# Patient Record
Sex: FEMALE | Race: WHITE | NOT HISPANIC OR LATINO | Employment: FULL TIME
[De-identification: names, ages, dates, MRNs, and addresses within clinical notes are randomized per-mention and may not be internally consistent; named-entity substitution may affect disease eponyms.]

---

## 2017-08-19 ENCOUNTER — HEALTH MAINTENANCE LETTER (OUTPATIENT)
Age: 37
End: 2017-08-19

## 2017-10-10 ENCOUNTER — OFFICE VISIT (OUTPATIENT)
Dept: INTERNAL MEDICINE | Facility: CLINIC | Age: 37
End: 2017-10-10
Payer: COMMERCIAL

## 2017-10-10 VITALS
HEART RATE: 80 BPM | HEIGHT: 68 IN | SYSTOLIC BLOOD PRESSURE: 108 MMHG | TEMPERATURE: 98.5 F | WEIGHT: 271 LBS | OXYGEN SATURATION: 98 % | DIASTOLIC BLOOD PRESSURE: 60 MMHG | BODY MASS INDEX: 41.07 KG/M2

## 2017-10-10 DIAGNOSIS — F32.1 MODERATE MAJOR DEPRESSION (H): ICD-10-CM

## 2017-10-10 DIAGNOSIS — R06.83 SNORING: ICD-10-CM

## 2017-10-10 DIAGNOSIS — E66.01 MORBID OBESITY (H): ICD-10-CM

## 2017-10-10 DIAGNOSIS — Z00.01 ENCOUNTER FOR ROUTINE ADULT MEDICAL EXAM WITH ABNORMAL FINDINGS: Primary | ICD-10-CM

## 2017-10-10 PROCEDURE — 99214 OFFICE O/P EST MOD 30 MIN: CPT | Mod: 25 | Performed by: INTERNAL MEDICINE

## 2017-10-10 PROCEDURE — 99395 PREV VISIT EST AGE 18-39: CPT | Performed by: INTERNAL MEDICINE

## 2017-10-10 RX ORDER — DESVENLAFAXINE 25 MG/1
25 TABLET, EXTENDED RELEASE ORAL DAILY
Qty: 30 TABLET | Refills: 11 | Status: SHIPPED | OUTPATIENT
Start: 2017-10-10 | End: 2018-10-23

## 2017-10-10 ASSESSMENT — PATIENT HEALTH QUESTIONNAIRE - PHQ9: SUM OF ALL RESPONSES TO PHQ QUESTIONS 1-9: 9
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
## 2017-10-10 NOTE — NURSING NOTE
"Chief Complaint   Patient presents with     Physical       Initial /60  Pulse 80  Temp 98.5  F (36.9  C) (Oral)  Ht 5' 8\" (1.727 m)  Wt 271 lb (122.9 kg)  LMP 09/25/2017  SpO2 98%  BMI 41.21 kg/m2 Estimated body mass index is 41.21 kg/(m^2) as calculated from the following:    Height as of this encounter: 5' 8\" (1.727 m).    Weight as of this encounter: 271 lb (122.9 kg).  Medication Reconciliation: complete  "

## 2017-10-10 NOTE — MR AVS SNAPSHOT
After Visit Summary   10/10/2017    Shannen Evans    MRN: [id number]           Patient Information     Date Of Birth          1980        Visit Information        Provider Department      10/10/2017 11:30 AM Kevin Francis MD [facility]        Today's Diagnoses     Moderate major depression (H)    -  1    Snoring        Encounter for routine adult medical exam with abnormal findings          Care Instructions    Pristiq  25mg tab, 1 tab daily for depression symptoms.   See me in 1 bob for follow-up. Call earlier if side effects with medication  Fasting labs in the next 1 month  [facility]. Call for appt  Referral to [facility]. They will call to schedule  Calorie/carbohydrate (sugar, bread, potato, pasta, etc) reduction in diet for weight loss.  Increase color on your plate with fruits and vegetables. Increase  frequency of walking or other aerobic exercise as able (goal is daily)                 Follow-ups after your visit        Additional Services     MENTAL HEALTH REFERRAL       Your provider has referred you to: [facility]: [facility] - Counseling (Individual/Couples/Family) - [facility] [phone]   [email or web address]/   *Patient will be contacted by [facility] scheduling partner, [facility]), to schedule an appointment.  Patients may also call [facility] to schedule.    All scheduling is subject to the client's specific insurance plan & benefits, provider/location availability, and provider clinical specialities.  Please arrive 15 minutes early for your first appointment and bring your completed paperwork.    Please be aware that coverage of these services is subject to the terms and limitations of your health insurance plan.  Call member services at your health plan with any benefit or coverage questions.            SLEEP  EVALUATION & MANAGEMENT REFERRAL - ADULT       Please be aware that coverage of these services is subject to the terms and limitations of your health insurance plan.  Call member services at your health plan with any benefit or coverage questions.      Please bring the following to your appointment:    >>   List of current medications   >>   This referral request   >>   Any documents/labs given to you for this referral    [place] [facility])   [phone] (Age 18 and up)                  Future tests that were ordered for you today     Open Future Orders        Priority Expected Expires Ordered    Comprehensive metabolic panel Routine 10/17/2017 10/10/2018 10/10/2017    TSH with free T4 reflex Routine 10/17/2017 10/10/2018 10/10/2017    CBC with platelets Routine 10/17/2017 10/10/2018 10/10/2017    Lipid panel reflex to direct LDL Routine 10/17/2017 10/10/2018 10/10/2017    Vitamin D Deficiency Routine 10/17/2017 10/10/2018 10/10/2017    SLEEP EVALUATION & MANAGEMENT REFERRAL - ADULT Routine  10/10/2018 10/10/2017            Who to contact     If you have questions or need follow up information about today's clinic visit or your schedule please contact [facility] directly at [phone].  Normal or non-critical lab and imaging results will be communicated to you by Exit41hart, letter or phone within 4 business days after the clinic has received the results. If you do not hear from us within 7 days, please contact the clinic through Exit41hart or phone. If you have a critical or abnormal lab result, we will notify you by phone as soon as possible.  Submit refill requests through [employer] or call your pharmacy and they will forward the refill request to us. Please allow 3 business days for your refill to be completed.          Additional Information About Your Visit        Exit41harOrthohub Information     [employer] gives you secure access to your electronic health record. If you see a primary care  "provider, you can also send messages to your care team and make appointments. If you have questions, please call your primary care clinic.  If you do not have a primary care provider, please call [phone] and they will assist you.        Care EveryWhere ID     This is your Care EveryWhere ID. This could be used by other organizations to access your [facility] medical records  [id number]        Your Vitals Were     Pulse Temperature Height Last Period Pulse Oximetry BMI (Body Mass Index)    80 98.5  F (36.9  C) (Oral) 5' 8\" (1.727 m) 09/25/2017 98% 41.21 kg/m2       Blood Pressure from Last 3 Encounters:   10/10/17 108/60   12/07/16 102/66   11/06/16 106/80    Weight from Last 3 Encounters:   10/10/17 271 lb (122.9 kg)   11/06/16 274 lb 9.6 oz (124.6 kg)   02/16/16 299 lb (135.6 kg)              We Performed the Following     MENTAL HEALTH REFERRAL          Today's Medication Changes          These changes are accurate as of: 10/10/17 12:25 PM.  If you have any questions, ask your nurse or doctor.               Start taking these medicines.        Dose/Directions    desvenlafaxine succinate 25 MG 24 hr tablet   Commonly known as:  PRISTIQ   Used for:  Moderate major depression (H)   Started by:  Kevin Francis MD        Dose:  25 mg   Take 1 tablet (25 mg) by mouth daily   Quantity:  30 tablet   Refills:  11         Stop taking these medicines if you haven't already. Please contact your care team if you have questions.     albuterol 108 (90 BASE) MCG/ACT Inhaler   Commonly known as:  PROVENTIL HFA   Stopped by:  Kevin Francis MD           calcium carbonate 500 MG chewable tablet   Commonly known as:  TUMS   Stopped by:  Kevin Francis MD                Where to get your medicines      These medications were sent to [facility] [address] [place], [place] - [address] AT [facility] [place]  [address], [facility] [address]     Phone:  [phone]     desvenlafaxine " succinate 25 MG 24 hr tablet                Primary Care Provider Office Phone # Fax #    Kevin Francis [phone]       [address] [facility] [address]        Equal Access to Services     LIV DELGADO : Apurva steel mike Soelle, waaxda luqadaha, qaybta kaalmada adehanhda, nazanin hu jean-pierre valentine. So [facility] [phone].    ATENCIÓN: Si habla español, tiene a kim disposición servicios gratuitos de asistencia lingüística. Llame al [phone].    We comply with applicable federal civil rights laws and [place] laws. We do not discriminate on the basis of race, color, national origin, age, disability, sex, sexual orientation, or gender identity.            Thank you!     Thank you for choosing [facility]  for your care. Our goal is always to provide you with excellent care. Hearing back from our patients is one way we can continue to improve our services. Please take a few minutes to complete the written survey that you may receive in the mail after your visit with us. Thank you!             Your Updated Medication List - Protect others around you: Learn how to safely use, store and throw away your medicines at www.disposemymeds.org.          This list is accurate as of: 10/10/17 12:25 PM.  Always use your most recent med list.                   Brand Name Dispense Instructions for use Diagnosis    desvenlafaxine succinate 25 MG 24 hr tablet    PRISTIQ    30 tablet    Take 1 tablet (25 mg) by mouth daily    Moderate major depression (H)       ibuprofen 400 MG tablet    ADVIL/MOTRIN    120 tablet    Take 1-2 tablets (400-800 mg) by mouth every 6 hours as needed for other (cramping)    S/P primary low transverse        TYLENOL PO      Take 1,000 mg by mouth every 8 hours as needed for mild pain or fever (back pain)        vitamin D 2000 UNITS tablet     100 tablet    Take 2,000 Units by mouth daily    Vitamin D deficiency       ZOLOFT 50 MG  tablet   Generic drug:  sertraline      Take 50 mg by mouth daily

## 2017-10-10 NOTE — PATIENT INSTRUCTIONS
Pristiq  25mg tab, 1 tab daily for depression symptoms.   See me in 1 bob for follow-up. Call earlier if side effects with medication  Fasting labs in the next 1 month  [facility]. Call for appt  [facility]erral to [facility]. They will call to schedule  Calorie/carbohydrate (sugar, bread, potato, pasta, etc) reduction in diet for weight loss.  Increase color on your plate with fruits and vegetables. Increase  frequency of walking or other aerobic exercise as able (goal is daily)

## 2017-10-11 PROBLEM — E66.01 MORBID OBESITY (H): Status: ACTIVE | Noted: 2017-10-11

## 2017-11-08 DIAGNOSIS — Z00.01 ENCOUNTER FOR ROUTINE ADULT MEDICAL EXAM WITH ABNORMAL FINDINGS: ICD-10-CM

## 2017-11-08 LAB
ALBUMIN SERPL-MCNC: 3.4 G/DL (ref 3.4–5)
ALP SERPL-CCNC: 89 U/L (ref 40–150)
ALT SERPL W P-5'-P-CCNC: 55 U/L (ref 0–50)
ANION GAP SERPL CALCULATED.3IONS-SCNC: 4 MMOL/L (ref 3–14)
AST SERPL W P-5'-P-CCNC: 41 U/L (ref 0–45)
BILIRUB SERPL-MCNC: 0.9 MG/DL (ref 0.2–1.3)
BUN SERPL-MCNC: 14 MG/DL (ref 7–30)
CALCIUM SERPL-MCNC: 8.8 MG/DL (ref 8.5–10.1)
CHLORIDE SERPL-SCNC: 106 MMOL/L (ref 94–109)
CHOLEST SERPL-MCNC: 166 MG/DL
CO2 SERPL-SCNC: 28 MMOL/L (ref 20–32)
CREAT SERPL-MCNC: 0.75 MG/DL (ref 0.52–1.04)
DEPRECATED CALCIDIOL+CALCIFEROL SERPL-MC: 23 UG/L (ref 20–75)
ERYTHROCYTE [DISTWIDTH] IN BLOOD BY AUTOMATED COUNT: 13 % (ref 10–15)
GFR SERPL CREATININE-BSD FRML MDRD: 88 ML/MIN/1.7M2
GLUCOSE SERPL-MCNC: 98 MG/DL (ref 70–99)
HCT VFR BLD AUTO: 41.7 % (ref 35–47)
HDLC SERPL-MCNC: 44 MG/DL
HGB BLD-MCNC: 13.7 G/DL (ref 11.7–15.7)
LDLC SERPL CALC-MCNC: 91 MG/DL
MCH RBC QN AUTO: 30.1 PG (ref 26.5–33)
MCHC RBC AUTO-ENTMCNC: 32.9 G/DL (ref 31.5–36.5)
MCV RBC AUTO: 92 FL (ref 78–100)
NONHDLC SERPL-MCNC: 122 MG/DL
PLATELET # BLD AUTO: 201 10E9/L (ref 150–450)
POTASSIUM SERPL-SCNC: 4.2 MMOL/L (ref 3.4–5.3)
PROT SERPL-MCNC: 7.5 G/DL (ref 6.8–8.8)
RBC # BLD AUTO: 4.55 10E12/L (ref 3.8–5.2)
SODIUM SERPL-SCNC: 138 MMOL/L (ref 133–144)
TRIGL SERPL-MCNC: 157 MG/DL
TSH SERPL DL<=0.005 MIU/L-ACNC: 1.26 MU/L (ref 0.4–4)
WBC # BLD AUTO: 8.1 10E9/L (ref 4–11)

## 2017-11-08 PROCEDURE — 80053 COMPREHEN METABOLIC PANEL: CPT | Performed by: INTERNAL MEDICINE

## 2017-11-08 PROCEDURE — 36415 COLL VENOUS BLD VENIPUNCTURE: CPT | Performed by: INTERNAL MEDICINE

## 2017-11-08 PROCEDURE — 82306 VITAMIN D 25 HYDROXY: CPT | Performed by: INTERNAL MEDICINE

## 2017-11-08 PROCEDURE — 85027 COMPLETE CBC AUTOMATED: CPT | Performed by: INTERNAL MEDICINE

## 2017-11-08 PROCEDURE — 84443 ASSAY THYROID STIM HORMONE: CPT | Performed by: INTERNAL MEDICINE

## 2017-11-08 PROCEDURE — 80061 LIPID PANEL: CPT | Performed by: INTERNAL MEDICINE

## 2017-11-11 ENCOUNTER — MYC MEDICAL ADVICE (OUTPATIENT)
Dept: INTERNAL MEDICINE | Facility: CLINIC | Age: 37
End: 2017-11-11

## 2017-11-11 DIAGNOSIS — K75.9 HEPATITIS: Primary | ICD-10-CM

## 2018-07-27 ENCOUNTER — TELEPHONE (OUTPATIENT)
Dept: INTERNAL MEDICINE | Facility: CLINIC | Age: 38
End: 2018-07-27

## 2018-07-27 NOTE — TELEPHONE ENCOUNTER
7/27/2018    Call Regarding Preventive Health Screening Cervical/PAP    Attempt 1    Message on voicemail     Comments:           Outreach   AT

## 2018-10-16 ENCOUNTER — TELEPHONE (OUTPATIENT)
Dept: INTERNAL MEDICINE | Facility: CLINIC | Age: 38
End: 2018-10-16

## 2018-10-16 NOTE — LETTER
[facility]  [address] [place] [address]  [phone]  October 16, 2018    Shannen Evans  [address]  [facility] [address]    Dear Shannen,    We care about your health and based on a review of your medical records, recommend the the following, to better manage your health:      You are in particular need of attention regarding:  -Depression/Anxiety  -Cervical Cancer Screening    I am recommending that you:     -schedule a PAP SMEAR EXAM which is due.  Please disregard this reminder if you have had this exam elsewhere within the last year.  It would be helpful for us to have a copy of your recent pap smear report in our file so that we can best coordinate your care.    If you are under/uninsured, we recommend you contact the [facility]. They offer pap smears at no charge or on a sliding fee charge. You can schedule with them at [phone]. Please have them send us the results.    Please complete the enclosed PHQ9 and mail back to clinic in the envelope provided.         Here is a list of Health Maintenance topics that are due now or due soon:  Health Maintenance Due   Topic Date Due     Depression Action Plan Review - yearly  03/12/1999     Pap Smear - every 3 years  05/21/2011     Depression Assessment - every 6 months  04/10/2018     Flu Vaccine (1) 09/01/2018       Please call us at [phone] or [phone] (or use Heartbeat) to address the above recommendations.     Thank you for trusting [facility].  We appreciate the opportunity to serve you and look forward to supporting your healthcare needs in the future.    If you have (or plan to have) any of these tests done at a facility other than a [facility] or a [facility], please have the results from these tests sent to your primary physician at [facility].    Healthy Regards,    Kevin Francis MD/Shweta Rae, CMA

## 2018-10-16 NOTE — TELEPHONE ENCOUNTER
Panel Management Review    Patient Active Problem List   Diagnosis     HYPERLIPIDEMIA LDL GOAL <160     Vitamin D deficiency     S/P primary low transverse      Morbid obesity (H)       Patient has the following on her problem list: None      Composite cancer screening  Chart review shows that this patient is due/due soon for the following Pap Smear  Summary:    Patient is due/failing the following:   DAP, PAP and PHQ9    Action needed:   Patient needs office visit for PAP. and Patient needs to do PHQ9.    Type of outreach:    Sent letter.    Questions for provider review:    None                                                                                                                                    Shweta Rae, CMA

## 2018-10-23 DIAGNOSIS — F32.1 MODERATE MAJOR DEPRESSION (H): ICD-10-CM

## 2018-10-23 RX ORDER — DESVENLAFAXINE 25 MG/1
TABLET, EXTENDED RELEASE ORAL
Qty: 30 TABLET | Refills: 0 | Status: SHIPPED | OUTPATIENT
Start: 2018-10-23 | End: 2018-12-10

## 2018-10-23 NOTE — TELEPHONE ENCOUNTER
"Requested Prescriptions   Pending Prescriptions Disp Refills     desvenlafaxine succinate (PRISTIQ) 25 MG 24 hr tablet [Pharmacy Med Name: DESVENLAFAXINE ER SUCCINATE 25MG T] 30 tablet 0     Sig: TAKE 1 TABLET(25 MG) BY MOUTH DAILY    Serotonin-Norepinephrine Reuptake Inhibitors  Failed    10/23/2018  1:52 PM       Failed - Blood pressure under 140/90 in past 12 months    BP Readings from Last 3 Encounters:   10/10/17 108/60   12/07/16 102/66   11/06/16 106/80                Failed - PHQ-9 score of less than 5 in past 6 months    Please review last PHQ-9 score.          Failed - Recent (6 mo) or future (30 days) visit within the authorizing provider's specialty    Patient had office visit in the last 6 months or has a visit in the next 30 days with authorizing provider or within the authorizing provider's specialty.  See \"Patient Info\" tab in inbasket, or \"Choose Columns\" in Meds & Orders section of the refill encounter.           Passed - Patient is age 18 or older       Passed - No active pregnancy on record       Passed - Normal serum creatinine on file in past 12 months    Recent Labs   Lab Test  11/08/17   0821   CR  0.75            Passed - No positive pregnancy test in past 12 months        PHQ-9 SCORE 10/10/2017   Total Score 9     Medication is being filled for 1 time refill only due to:  Patient needs to be seen because it has been more than one year since last visit.    "

## 2018-10-23 NOTE — LETTER
[facility]  [address] [id number] [phone]            Shannen Evans  [address]  [facility] [address]        October 23, 2018    Dear Shannen,    While refilling your prescription today, we noticed that you are due for an appointment with your provider.  We will refill your prescription for 30 days, but a follow-up appointment must be made before any additional refills can be approved.     Taking care of your health is important to us and we look forward to seeing you in the near future.  Please call us at [phone] or [phone] (or use [employer]) to schedule an appointment.     Please disregard this notice if you have already made an appointment.    Sincerely,        [facility]

## 2018-11-14 ENCOUNTER — TELEPHONE (OUTPATIENT)
Dept: DERMATOLOGY | Facility: CLINIC | Age: 38
End: 2018-11-14

## 2018-12-10 DIAGNOSIS — F32.1 MODERATE MAJOR DEPRESSION (H): ICD-10-CM

## 2018-12-11 NOTE — TELEPHONE ENCOUNTER
"Requested Prescriptions   Pending Prescriptions Disp Refills     desvenlafaxine succinate (PRISTIQ) 25 MG 24 hr tablet [Pharmacy Med Name: DESVENLAFAXINE ER SUCCINATE 25MG T]  Last Written Prescription Date:  10/23/2018  Last Fill Quantity: 30,  # refills: 0   Last Office Visit: 10/10/2017   Future Office Visit:      30 tablet 0     Sig: TAKE 1 TABLET BY MOUTH DAILY    Serotonin-Norepinephrine Reuptake Inhibitors  Failed - 12/10/2018  8:40 PM       Failed - Blood pressure under 140/90 in past 12 months    BP Readings from Last 3 Encounters:   10/10/17 108/60   12/07/16 102/66   11/06/16 106/80                Failed - PHQ-9 score of less than 5 in past 6 months    Please review last PHQ-9 score.          Failed - Normal serum creatinine on file in past 12 months    Recent Labs   Lab Test 11/08/17  0821   CR 0.75            Failed - Recent (6 mo) or future (30 days) visit within the authorizing provider's specialty    Patient had office visit in the last 6 months or has a visit in the next 30 days with authorizing provider or within the authorizing provider's specialty.  See \"Patient Info\" tab in inbasket, or \"Choose Columns\" in Meds & Orders section of the refill encounter.           Passed - Patient is age 18 or older       Passed - No active pregnancy on record       Passed - No positive pregnancy test in past 12 months          "

## 2018-12-11 NOTE — TELEPHONE ENCOUNTER
Routing refill request to provider for review/approval because:  Luba given x1 and patient did not follow up, please advise  Patient needs to be seen because it has been more than 1 year since last office visit.

## 2018-12-12 RX ORDER — DESVENLAFAXINE 25 MG/1
TABLET, EXTENDED RELEASE ORAL
Qty: 30 TABLET | Refills: 0 | Status: SHIPPED | OUTPATIENT
Start: 2018-12-12 | End: 2020-04-27

## 2018-12-12 NOTE — TELEPHONE ENCOUNTER
Not seen in > 1 year and needs appt and has been sent letter. Please call pt and get appt scheduled and then send RF request back to MD to address

## 2019-01-30 ENCOUNTER — OFFICE VISIT (OUTPATIENT)
Dept: URGENT CARE | Facility: URGENT CARE | Age: 39
End: 2019-01-30
Payer: COMMERCIAL

## 2019-01-30 ENCOUNTER — ANCILLARY PROCEDURE (OUTPATIENT)
Dept: GENERAL RADIOLOGY | Facility: CLINIC | Age: 39
End: 2019-01-30
Payer: COMMERCIAL

## 2019-01-30 VITALS
DIASTOLIC BLOOD PRESSURE: 80 MMHG | TEMPERATURE: 99.1 F | HEART RATE: 68 BPM | OXYGEN SATURATION: 99 % | SYSTOLIC BLOOD PRESSURE: 120 MMHG

## 2019-01-30 DIAGNOSIS — M79.672 LEFT FOOT PAIN: ICD-10-CM

## 2019-01-30 DIAGNOSIS — M79.672 LEFT FOOT PAIN: Primary | ICD-10-CM

## 2019-01-30 PROCEDURE — 73630 X-RAY EXAM OF FOOT: CPT | Mod: LT

## 2019-01-30 PROCEDURE — 99213 OFFICE O/P EST LOW 20 MIN: CPT | Performed by: PHYSICIAN ASSISTANT

## 2019-01-30 NOTE — PROGRESS NOTES
Patient presents with:  Musculoskeletal Problem: foot pain 2xdays tinder pain.   Urgent Care    SUBJECTIVE:  Chief Complaint   Patient presents with     Musculoskeletal Problem     foot pain 2xdays tinder pain.      Urgent Care     Shannen Evans is a 38 year old female presents with a chief complaint of left foot pain onset 2 days ago.  Had been bowling the night prior but does not recall any particular injury.    Denies any swelling or open wounds.      She is otherwise in her usual state of health.        Past Medical History:   Diagnosis Date     Cholelithiasis 2010     Gestational diabetes     diet controlled     Hyperlipidemia LDL goal <160 10/31/2010     Morbid obesity (H) 10/11/2017     Vitamin D deficiency 2014     Patient Active Problem List   Diagnosis     HYPERLIPIDEMIA LDL GOAL <160     Vitamin D deficiency     S/P primary low transverse      Morbid obesity (H)     Social History     Tobacco Use     Smoking status: Former Smoker     Types: Cigarettes     Last attempt to quit: 2011     Years since quittin.6     Smokeless tobacco: Never Used   Substance Use Topics     Alcohol use: No       ROS:  CONSTITUTIONAL:NEGATIVE for fever, chills, change in weight  INTEGUMENTARY/SKIN: NEGATIVE for worrisome rashes, moles or lesions  MUSCULOSKELETAL:AS PER hpi  NEURO: NEGATIVE for weakness, dizziness or paresthesias  Review of systems negative except as stated above.    EXAM:   /80   Pulse 68   Temp 99.1  F (37.3  C) (Oral)   SpO2 99%   Gen: healthy,alert,no distress  Extremity: LEFT FOOT: Tenderness to palpation at mid arch.  No swelling. No rash, ecchymosis or erythema.     There is not compromise to the distal circulation.  Pulses are +2 and CRT is brisk  GENERAL APPEARANCE: healthy, alert and no distress    X-RAY was done.    (M79.672) Left foot pain  (primary encounter diagnosis)  Comment: consistent with foot strain  Plan: XR Foot Left G/E 3 Views        Ice to area  over thin cloth.  Elevate frequently throughout day.    Follow up with Ortho should symptoms persist or worsen.      Ibuprofen prn.    Work note for light duty for the next 4 days.    Patient expresses understanding and agreement with the assessment and plan as above.

## 2019-01-30 NOTE — LETTER
[place] [facility]  [address] [address]  [phone]      January 30, 2019    RE:  Shannen Evans                                                                                                                                                       [address]  [facility] [address]            To whom it may concern:    Shannen Evasn was seen in clinic today for a foot injury sustained 2 days ago.  She may return to work tomorrow, but must not bear weight on her left foot for more than 15 minutes in any given hour through Sunday, 2/3/19.  Thereafter she may return to unrestricted duties on 2/4/19.        Sincerely,        Kesha CHILDRESS    [facility]

## 2019-01-30 NOTE — PATIENT INSTRUCTIONS
(Y87.813) Left foot pain  (primary encounter diagnosis)  Comment: consistent with foot strain  Plan: XR Foot Left G/E 3 Views        Ice to area over thin cloth.  Elevate frequently throughout day.    Follow up with Ortho should symptoms persist or worsen.

## 2019-02-04 NOTE — PROGRESS NOTES
SUBJECTIVE:   CC: Shannen Evans is an 38 year old woman who presents for preventive health visit.     Healthy Habits:  Answers for HPI/ROS submitted by the patient on 2019   Annual Exam:  Frequency of exercise:: 2-3 days/week  Getting at least 3 servings of Calcium per day:: Yes  Diet:: Regular (no restrictions)  Taking medications regularly:: Yes  Medication side effects:: None  Bi-annual eye exam:: NO  Dental care twice a year:: NO  Sleep apnea or symptoms of sleep apnea:: Excessive snoring  Additional concerns today:: No  Duration of exercise:: 30-45 minutes          Today's PHQ-2 Score:   PHQ-2 (  [employer]) 10/10/2017 10/10/2017   Q1: Little interest or pleasure in doing things 1 1   Q2: Feeling down, depressed or hopeless 2 1   PHQ-2 Score 3 2   Q1: Little interest or pleasure in doing things Several days -   Q2: Feeling down, depressed or hopeless Several days -   PHQ-2 Score 2 -       Abuse: Current or Past(Physical, Sexual or Emotional)- No  Do you feel safe in your environment? Yes    Social History     Tobacco Use     Smoking status: Former Smoker     Types: Cigarettes     Last attempt to quit: 2011     Years since quittin.6     Smokeless tobacco: Never Used   Substance Use Topics     Alcohol use: No     If you drink alcohol do you typically have >3 drinks per day or >7 drinks per week? No                     Reviewed orders with patient.  Reviewed health maintenance and updated orders accordingly - Yes  Labs reviewed in EPIC    Mammogram not appropriate for this patient based on age.    Pertinent mammograms are reviewed under the imaging tab.  History of abnormal Pap smear: NO - age 30-65 PAP every 5 years with negative HPV co-testing recommended. Sees Dr Caballero from OB GYN     Reviewed and updated as needed this visit by clinical staff         Reviewed and updated as needed this visit by Provider            ROS:  CONSTITUTIONAL: NEGATIVE for fever, chills. Weight up 2 pounds in 1  "year  INTEGUMENTARU/SKIN: NEGATIVE for worrisome rashes, moles or lesions  EYES: NEGATIVE for vision changes or irritation. Due for eye exam  ENT: NEGATIVE for ear, mouth and throat problems. Occ nasal congestion that is not bothersome. Improve with saline prn  RESP: NEGATIVE for significant cough or SOB  BREAST: NEGATIVE for masses, tenderness or discharge  CV: NEGATIVE for chest pain, palpitations or peripheral edema  GI: NEGATIVE for nausea, abdominal pain, heartburn, or change in bowel habits  : NEGATIVE for unusual urinary or vaginal symptoms. Periods are regular.  MUSCULOSKELETAL: NEGATIVE for significant arthralgias or myalgia  NEURO: NEGATIVE for weakness, dizziness or paresthesias  PSYCHIATRIC: POSITIVE for mild depression. PHQ = 7.  On Pristiq and would like to try higher dosage    OBJECTIVE:   /78   Pulse 68   Temp 98.1  F (36.7  C) (Oral)   Resp 16   Ht 1.715 m (5' 7.5\")   Wt 123.8 kg (273 lb)   LMP 01/25/2019   SpO2 98%   BMI 42.13 kg/m    EXAM:  General appearance -  alert, no distress  Skin - No rashes or lesions.  Head - normocephalic, atraumatic  Eyes - SELINA, EOMI, fundi exam with nondilated pupils negative.  Ears - External ears normal. Canals clear. TM's normal.  Nose/Sinuses - Nares normal. Septum midline. Mucosa normal. No drainage or sinus tenderness.  Oropharynx - No erythema, no adenopathy, no exudates.  Nontender larger bilateral tonsils present that narrow posterior pharyngeal airway some  Neck - Supple without adenopathy or thyromegaly. No bruits.  Lungs - Clear to auscultation without wheezes/rhonchi.  Heart - Regular rate and rhythm without murmurs, clicks, or gallops.  Nodes - No supraclavicular, axillary, or inguinal adenopathy palpable.  Breasts - deferred  Abdomen - Abdomen  Obese, soft, non-tender. BS normal. No masses or hepatosplenomegaly palpable. No bruits.  Extremities -No cyanosis, clubbing or edema.    Musculoskeletal - Spine ROM normal. Muscular strength " "intact.   Peripheral pulses - radial=4/4, femoral=4/4, posterior tibial=4/4, dorsalis pedis=4/4,  Neuro - Gait normal. Reflexes normal and symmetric. Sensation grossly WNL.  Genital/Rectal - deferred       ASSESSMENT/PLAN:   1. Encounter for routine adult medical exam with abnormal findings  Screening labs as ordered.  Due for Pap/pelvic exam with  GYN.  Other healthcare maintenance up-to-date  - Comprehensive metabolic panel  - Lipid panel reflex to direct LDL Fasting  - CBC with platelets       2. Moderate major depression (H)   PHQ=7.  Contributing factors include patient's mother's mental health (currently undergoing ECT), probable untreated sleep apnea, etc.  It were going well for patient per her report and home situation okay.  Patient wishes to try higher dose of Pristiq  - desvenlafaxine (PRISTIQ) 50 MG 24 hr tablet; Take 1 tablet (50 mg) by mouth daily  Dispense: 90 tablet; Refill: 3    3. Morbid obesity (H)  Contributing to sleep apnea risk, etc.  See plan discussion below for counseling    4. Snoring  Probable sleep apnea as witnessed by her wife.  Referred to [place] [facility] for evaluation.  With larger tonsillar size bilaterally, possible future referral to ENT if sleep apnea confirmed to discuss whether tonsillectomy may be of benefit to allow for larger airway versus CPAP versus other  - SLEEP EVALUATION & MANAGEMENT REFERRAL - ADULT -[facility] [phone]  (Age 18 and up); Future      COUNSELING:   Reviewed preventive health counseling, as reflected in patient instructions  Special attention given to:        need for pap/pelvic       Regular exercise       Healthy diet/nutrition    BP Readings from Last 1 Encounters:   01/30/19 120/80     Estimated body mass index is 41.21 kg/m  as calculated from the following:    Height as of 10/10/17: 1.727 m (5' 8\").    Weight as of 10/10/17: 122.9 kg (271 lb).      Weight management plan: Discussed healthy diet and exercise " guidelines     reports that she quit smoking about 7 years ago. Her smoking use included cigarettes. she has never used smokeless tobacco.      Counseling Resources:  ATP IV Guidelines  Pooled Cohorts Equation Calculator  Breast Cancer Risk Calculator  FRAX Risk Assessment  ICSI Preventive Guidelines  Dietary Guidelines for Americans, 2010  USDA's MyPlate  ASA Prophylaxis  Lung CA Screening      PLAN:  Increase Pristiq to 50mg daily  Email me in [employer] in 4 weeks with update re: mood or earlier if side effects with new med dose  Labs as ordered   Calorie/carbohydrate (sugar, bread, potato, pasta, etc) reduction in diet for weight loss.  Increase color on your plate with fruits and vegetables. Increase  frequency of walking or other aerobic exercise as able (goal is daily)  Eye appointment  Appt with Dr Caballero for pap/pelvic  Referal to  Sleep clinic Call [phone] for appt  See me in 6 months for follow-up or earlier as needed           Kevin Francis MD  [facility]

## 2019-02-04 NOTE — PATIENT INSTRUCTIONS
Increase Pristiq to 50mg daily  Email me in Mychart in 4 weeks with update re: mood or earlier if side effects with new med dose  Labs as ordered   Calorie/carbohydrate (sugar, bread, potato, pasta, etc) reduction in diet for weight loss.  Increase color on your plate with fruits and vegetables. Increase  frequency of walking or other aerobic exercise as able (goal is daily)  Eye appointment  Appt with Dr Caballero for pap/pelvic  Referal to  Sleep clinic Call [phone] for appt  See me in 6 months for follow-up or earlier as needed

## 2019-02-05 ENCOUNTER — OFFICE VISIT (OUTPATIENT)
Dept: INTERNAL MEDICINE | Facility: CLINIC | Age: 39
End: 2019-02-05
Payer: COMMERCIAL

## 2019-02-05 VITALS
DIASTOLIC BLOOD PRESSURE: 78 MMHG | HEIGHT: 68 IN | WEIGHT: 273 LBS | SYSTOLIC BLOOD PRESSURE: 122 MMHG | HEART RATE: 68 BPM | OXYGEN SATURATION: 98 % | BODY MASS INDEX: 41.37 KG/M2 | TEMPERATURE: 98.1 F | RESPIRATION RATE: 16 BRPM

## 2019-02-05 DIAGNOSIS — R06.83 SNORING: ICD-10-CM

## 2019-02-05 DIAGNOSIS — E66.01 MORBID OBESITY (H): ICD-10-CM

## 2019-02-05 DIAGNOSIS — F32.1 MODERATE MAJOR DEPRESSION (H): ICD-10-CM

## 2019-02-05 DIAGNOSIS — Z00.01 ENCOUNTER FOR ROUTINE ADULT MEDICAL EXAM WITH ABNORMAL FINDINGS: Primary | ICD-10-CM

## 2019-02-05 LAB
ALBUMIN SERPL-MCNC: 3.5 G/DL (ref 3.4–5)
ALP SERPL-CCNC: 86 U/L (ref 40–150)
ALT SERPL W P-5'-P-CCNC: 49 U/L (ref 0–50)
ANION GAP SERPL CALCULATED.3IONS-SCNC: 1 MMOL/L (ref 3–14)
AST SERPL W P-5'-P-CCNC: 30 U/L (ref 0–45)
BILIRUB SERPL-MCNC: 1 MG/DL (ref 0.2–1.3)
BUN SERPL-MCNC: 12 MG/DL (ref 7–30)
CALCIUM SERPL-MCNC: 8.6 MG/DL (ref 8.5–10.1)
CHLORIDE SERPL-SCNC: 105 MMOL/L (ref 94–109)
CHOLEST SERPL-MCNC: 180 MG/DL
CO2 SERPL-SCNC: 29 MMOL/L (ref 20–32)
CREAT SERPL-MCNC: 0.7 MG/DL (ref 0.52–1.04)
ERYTHROCYTE [DISTWIDTH] IN BLOOD BY AUTOMATED COUNT: 12.4 % (ref 10–15)
GFR SERPL CREATININE-BSD FRML MDRD: >90 ML/MIN/{1.73_M2}
GLUCOSE SERPL-MCNC: 97 MG/DL (ref 70–99)
HCT VFR BLD AUTO: 40.8 % (ref 35–47)
HDLC SERPL-MCNC: 47 MG/DL
HGB BLD-MCNC: 13.9 G/DL (ref 11.7–15.7)
LDLC SERPL CALC-MCNC: 97 MG/DL
MCH RBC QN AUTO: 30.2 PG (ref 26.5–33)
MCHC RBC AUTO-ENTMCNC: 34.1 G/DL (ref 31.5–36.5)
MCV RBC AUTO: 89 FL (ref 78–100)
NONHDLC SERPL-MCNC: 133 MG/DL
PLATELET # BLD AUTO: 198 10E9/L (ref 150–450)
POTASSIUM SERPL-SCNC: 3.9 MMOL/L (ref 3.4–5.3)
PROT SERPL-MCNC: 7.4 G/DL (ref 6.8–8.8)
RBC # BLD AUTO: 4.6 10E12/L (ref 3.8–5.2)
SODIUM SERPL-SCNC: 135 MMOL/L (ref 133–144)
TRIGL SERPL-MCNC: 179 MG/DL
WBC # BLD AUTO: 8.5 10E9/L (ref 4–11)

## 2019-02-05 PROCEDURE — 80053 COMPREHEN METABOLIC PANEL: CPT | Performed by: INTERNAL MEDICINE

## 2019-02-05 PROCEDURE — 99395 PREV VISIT EST AGE 18-39: CPT | Performed by: INTERNAL MEDICINE

## 2019-02-05 PROCEDURE — 85027 COMPLETE CBC AUTOMATED: CPT | Performed by: INTERNAL MEDICINE

## 2019-02-05 PROCEDURE — 36415 COLL VENOUS BLD VENIPUNCTURE: CPT | Performed by: INTERNAL MEDICINE

## 2019-02-05 PROCEDURE — 80061 LIPID PANEL: CPT | Performed by: INTERNAL MEDICINE

## 2019-02-05 RX ORDER — DESVENLAFAXINE 50 MG/1
50 TABLET, FILM COATED, EXTENDED RELEASE ORAL DAILY
Qty: 90 TABLET | Refills: 3 | Status: SHIPPED | OUTPATIENT
Start: 2019-02-05 | End: 2020-02-10

## 2019-02-05 RX ORDER — DESVENLAFAXINE 25 MG/1
25 TABLET, EXTENDED RELEASE ORAL DAILY
Qty: 30 TABLET | Refills: 0 | Status: CANCELLED | OUTPATIENT
Start: 2019-02-05

## 2019-02-05 ASSESSMENT — PATIENT HEALTH QUESTIONNAIRE - PHQ9: SUM OF ALL RESPONSES TO PHQ QUESTIONS 1-9: 7

## 2019-02-05 ASSESSMENT — MIFFLIN-ST. JEOR: SCORE: 1963.88

## 2019-02-05 NOTE — LETTER
My Depression Action Plan  Name: Shannen Evans   Date of Birth 1980  Date: 2/5/2019    My doctor: Kevin Francis   My clinic: [address] [id number] [phone]          GREEN    ZONE   Good Control    What it looks like:     Things are going generally well. You have normal up s and down s. You may even feel depressed from time to time, but bad moods usually last less than a day.   What you need to do:  1. Continue to care for yourself (see self care plan)  2. Check your depression survival kit and update it as needed  3. Follow your physician s recommendations including any medication.  4. Do not stop taking medication unless you consult with your physician first.           YELLOW         ZONE Getting Worse    What it looks like:     Depression is starting to interfere with your life.     It may be hard to get out of bed; you may be starting to isolate yourself from others.    Symptoms of depression are starting to last most all day and this has happened for several days.     You may have suicidal thoughts but they are not constant.   What you need to do:     1. Call your care team, your response to treatment will improve if you keep your care team informed of your progress. Yellow periods are signs an adjustment may need to be made.     2. Continue your self-care, even if you have to fake it!    3. Talk to someone in your support network    4. Open up your depression survival kit           RED    ZONE Medical Alert - Get Help    What it looks like:     Depression is seriously interfering with your life.     You may experience these or other symptoms: You can t get out of bed most days, can t work or engage in other necessary activities, you have trouble taking care of basic hygiene, or basic responsibilities, thoughts of suicide or death that will not go away, self-injurious behavior.     What you need to do:  1. Call your care  team and request a same-day appointment. If they are not available (weekends or after hours) call your local crisis line, emergency room or 911.            Depression Self Care Plan / Survival Kit    Self-Care for Depression  Here s the deal. Your body and mind are really not as separate as most people think.  What you do and think affects how you feel and how you feel influences what you do and think. This means if you do things that people who feel good do, it will help you feel better.  Sometimes this is all it takes.  There is also a place for medication and therapy depending on how severe your depression is, so be sure to consult with your medical provider and/ or Behavioral Health Consultant if your symptoms are worsening or not improving.     In order to better manage my stress, I will:    Exercise  Get some form of exercise, every day. This will help reduce pain and release endorphins, the  feel good  chemicals in your brain. This is almost as good as taking antidepressants!  This is not the same as joining a gym and then never going! (they count on that by the way ) It can be as simple as just going for a walk or doing some gardening, anything that will get you moving.      Hygiene   Maintain good hygiene (Get out of bed in the morning, Make your bed, Brush your teeth, Take a shower, and Get dressed like you were going to work, even if you are unemployed).  If your clothes don't fit try to get ones that do.    Diet  I will strive to eat foods that are good for me, drink plenty of water, and avoid excessive sugar, caffeine, alcohol, and other mood-altering substances.  Some foods that are helpful in depression are: complex carbohydrates, B vitamins, flaxseed, fish or fish oil, fresh fruits and vegetables.    Psychotherapy  I agree to participate in Individual Therapy (if recommended).    Medication  If prescribed medications, I agree to take them.  Missing doses can result in serious side effects.  I  understand that drinking alcohol, or other illicit drug use, may cause potential side effects.  I will not stop my medication abruptly without first discussing it with my provider.    Staying Connected With Others  I will stay in touch with my friends, family members, and my primary care provider/team.    Use your imagination  Be creative.  We all have a creative side; it doesn t matter if it s oil painting, sand castles, or mud pies! This will also kick up the endorphins.    Witness Beauty  (AKA stop and smell the roses) Take a look outside, even in mid-winter. Notice colors, textures. Watch the squirrels and birds.     Service to others  Be of service to others.  There is always someone else in need.  By helping others we can  get out of ourselves  and remember the really important things.  This also provides opportunities for practicing all the other parts of the program.    Humor  Laugh and be silly!  Adjust your TV habits for less news and crime-drama and more comedy.    Control your stress  Try breathing deep, massage therapy, biofeedback, and meditation. Find time to relax each day.     My support system    Clinic Contact:  Phone number:    Contact 1:  Phone number:    Contact 2:  Phone number:    Congregation/:  Phone number:    Therapist:  Phone number:    Local crisis center:    Phone number:    Other community support:  Phone number:

## 2019-02-07 DIAGNOSIS — E78.5 HYPERLIPIDEMIA LDL GOAL <100: Primary | ICD-10-CM

## 2019-06-18 ENCOUNTER — OFFICE VISIT (OUTPATIENT)
Dept: URGENT CARE | Facility: URGENT CARE | Age: 39
End: 2019-06-18
Payer: COMMERCIAL

## 2019-06-18 VITALS
TEMPERATURE: 99 F | DIASTOLIC BLOOD PRESSURE: 62 MMHG | BODY MASS INDEX: 40.92 KG/M2 | HEART RATE: 83 BPM | RESPIRATION RATE: 14 BRPM | SYSTOLIC BLOOD PRESSURE: 124 MMHG | OXYGEN SATURATION: 96 % | HEIGHT: 68 IN | WEIGHT: 270 LBS

## 2019-06-18 DIAGNOSIS — H00.014 HORDEOLUM EXTERNUM OF LEFT UPPER EYELID: Primary | ICD-10-CM

## 2019-06-18 PROCEDURE — 99213 OFFICE O/P EST LOW 20 MIN: CPT | Performed by: FAMILY MEDICINE

## 2019-06-18 RX ORDER — AZITHROMYCIN 250 MG/1
TABLET, FILM COATED ORAL
Qty: 6 TABLET | Refills: 0 | Status: SHIPPED | OUTPATIENT
Start: 2019-06-18 | End: 2019-06-23

## 2019-06-18 ASSESSMENT — MIFFLIN-ST. JEOR: SCORE: 1953.21

## 2019-06-18 NOTE — PATIENT INSTRUCTIONS
Patient Education     Sty (or Stye)  A sty is an infection of the oil gland of the eyelid. It may develop into a small pocket of pus (an abscess). This can cause pain, redness, and swelling. In early stages, a sty is treated with antibiotic cream, eye drops, or a small towel soaked in warm water (a warm compress). More severe cases may need to be opened and drained by a healthcare provider.  Home care    Eye drops or ointment are usually prescribed to treat the infection. Use these as directed.     Artificial tears may also be used to lubricate the eye and make it more comfortable. You can buy these over the counter without a prescription. Talk with your healthcare provider before using any over-the-counter treatment for a sty.    Apply a warm, damp towel to the affected eye for at least 5 minutes, 3 to 4 times a day for a week. Warm compresses open the pores and speed the healing. But if the compresses are too hot, they may burn your eyelid.    Sometimes the sty will drain with this treatment alone. If this happens, keep using the antibiotic until all the redness and swelling are gone.    Wash your hands before and after touching the infected eyelid to avoid spreading the infection.    Don t squeeze or try to break open the sty.  Follow-up care  Follow up with your healthcare provider, or as advised.   When to seek medical advice  Call your healthcare provider right away if any of these occur:    Increase in swelling or redness around the eyelid after 48 to 72 hours    Increase in eye pain or the eyelid blisters    Increase in warmth--the eyelid feels hot    Drainage of blood or thick pus from the sty    Blister on the eyelid    Inability to open the eyelid due to swelling    Fever of 100.4 F (38 C) or above, or as directed by your provider    Vision changes    Headache or stiff neck    The sty comes back  Date Last Reviewed: 8/1/2017 2000-2018 [employer] [address] [facility], [place]  [address]. All rights reserved. This information is not intended as a substitute for professional medical care. Always follow your healthcare professional's instructions.

## 2019-06-18 NOTE — PROGRESS NOTES
"EPICSPMNEYESUBJECTIVE:Chief Complaint:   Chief Complaint   Patient presents with     Urgent Care     Eye Lid Swelling     Swollen left eyelid, red, irritated x 4-5d  Possible foreign body -Woke up this AM w/eye mattered shut       History of Present Illness: Shannen Evans is a 39 year old female who presents complaining of tender bump on eyelid for few days.  Onset/timing: gradual.   Associated Signs and Symptoms: discomfort   Treatment measures tried include: none   Contact wearer : No    Past Medical History:   Diagnosis Date     Cholelithiasis 2010     Gestational diabetes     diet controlled     Hyperlipidemia LDL goal <160 10/31/2010     Moderate major depression (H) 2019     Morbid obesity (H) 10/11/2017     Vitamin D deficiency 2014     Allergies   Allergen Reactions     Amoxicillin Hives     As an infant       Penicillin [Penicillins] Hives     Social History     Tobacco Use     Smoking status: Former Smoker     Types: Cigarettes     Last attempt to quit: 2011     Years since quittin.9     Smokeless tobacco: Never Used   Substance Use Topics     Alcohol use: No       ROS:  no fevers  no photophobia, vision change  SKIN: no eythema    OBJECTIVE:  /62 (BP Location: Left arm, Patient Position: Sitting, Cuff Size: Adult Large)   Pulse 83   Temp 99  F (37.2  C) (Oral)   Resp 14   Ht 1.727 m (5' 8\")   Wt 122.5 kg (270 lb)   LMP  (LMP Unknown)   SpO2 96%   Breastfeeding? No   BMI 41.05 kg/m     General: no acute distress  Eye exam: tender enlarged lump eyelid.  PERRLA, no photophobia, conjunctiva clear      ICD-10-CM    1. Hordeolum externum of left upper eyelid H00.014 azithromycin (ZITHROMAX) 250 MG tablet     OPHTHALMOLOGY ADULT REFERRAL       warms packs, f/u Opthalmology if persists as some sty's need I&D.     "

## 2019-06-18 NOTE — LETTER
[place] [facility]  [address] [address]  [phone]      June 18, 2019    RE:  Shannen Evans                                                                                                                                                       [address]  [facility] [address]            To whom it may concern:    Shannen WRIGHT Nathan is under my professional care for Medical.    She  may return to work with the following: No working or lifting restrictions on or about 6-20-19.          Sincerely,        Casey Zavala    [place] [facility]

## 2019-06-20 ENCOUNTER — TRANSFERRED RECORDS (OUTPATIENT)
Dept: HEALTH INFORMATION MANAGEMENT | Facility: CLINIC | Age: 39
End: 2019-06-20

## 2019-10-25 ENCOUNTER — OFFICE VISIT (OUTPATIENT)
Dept: URGENT CARE | Facility: URGENT CARE | Age: 39
End: 2019-10-25
Payer: COMMERCIAL

## 2019-10-25 VITALS
DIASTOLIC BLOOD PRESSURE: 80 MMHG | WEIGHT: 270 LBS | HEART RATE: 71 BPM | TEMPERATURE: 98.2 F | SYSTOLIC BLOOD PRESSURE: 130 MMHG | RESPIRATION RATE: 16 BRPM | BODY MASS INDEX: 41.05 KG/M2 | OXYGEN SATURATION: 100 %

## 2019-10-25 DIAGNOSIS — J20.9 ACUTE BRONCHITIS WITH SYMPTOMS > 10 DAYS: Primary | ICD-10-CM

## 2019-10-25 PROCEDURE — 99213 OFFICE O/P EST LOW 20 MIN: CPT | Performed by: FAMILY MEDICINE

## 2019-10-25 RX ORDER — AZITHROMYCIN 250 MG/1
TABLET, FILM COATED ORAL
Qty: 6 TABLET | Refills: 0 | Status: SHIPPED | OUTPATIENT
Start: 2019-10-25 | End: 2020-04-27

## 2019-10-25 RX ORDER — CODEINE PHOSPHATE AND GUAIFENESIN 10; 100 MG/5ML; MG/5ML
1-2 SOLUTION ORAL EVERY 4 HOURS PRN
Qty: 120 ML | Refills: 0 | Status: SHIPPED | OUTPATIENT
Start: 2019-10-25 | End: 2020-04-27

## 2019-10-25 RX ORDER — ALBUTEROL SULFATE 90 UG/1
2 AEROSOL, METERED RESPIRATORY (INHALATION) EVERY 6 HOURS
Qty: 1 INHALER | Refills: 0 | Status: SHIPPED | OUTPATIENT
Start: 2019-10-25 | End: 2021-05-10

## 2019-10-29 ENCOUNTER — OFFICE VISIT (OUTPATIENT)
Dept: URGENT CARE | Facility: URGENT CARE | Age: 39
End: 2019-10-29
Payer: COMMERCIAL

## 2019-10-29 ENCOUNTER — ANCILLARY PROCEDURE (OUTPATIENT)
Dept: GENERAL RADIOLOGY | Facility: CLINIC | Age: 39
End: 2019-10-29
Attending: PHYSICIAN ASSISTANT
Payer: COMMERCIAL

## 2019-10-29 VITALS
HEART RATE: 64 BPM | TEMPERATURE: 97.1 F | RESPIRATION RATE: 16 BRPM | DIASTOLIC BLOOD PRESSURE: 82 MMHG | OXYGEN SATURATION: 97 % | SYSTOLIC BLOOD PRESSURE: 135 MMHG

## 2019-10-29 DIAGNOSIS — R06.2 WHEEZING: ICD-10-CM

## 2019-10-29 DIAGNOSIS — R05.9 COUGH: ICD-10-CM

## 2019-10-29 DIAGNOSIS — R05.9 COUGH: Primary | ICD-10-CM

## 2019-10-29 PROCEDURE — 71046 X-RAY EXAM CHEST 2 VIEWS: CPT

## 2019-10-29 PROCEDURE — 99214 OFFICE O/P EST MOD 30 MIN: CPT | Performed by: PHYSICIAN ASSISTANT

## 2019-10-29 RX ORDER — DOXYCYCLINE 100 MG/1
100 CAPSULE ORAL 2 TIMES DAILY
Qty: 20 CAPSULE | Refills: 0 | Status: SHIPPED | OUTPATIENT
Start: 2019-10-29 | End: 2020-04-27

## 2019-10-29 RX ORDER — PREDNISONE 20 MG/1
20 TABLET ORAL DAILY
Qty: 5 TABLET | Refills: 0 | Status: SHIPPED | OUTPATIENT
Start: 2019-10-29 | End: 2020-04-27

## 2019-10-29 NOTE — LETTER
[place] [facility]  [address] [address]  [phone]      October 29, 2019    RE:  Shannen Evans                                                                                                                                                       [address] [facility] [address]            To whom it may concern:    Shannen Evans was seen in clinic today for illness.  She may return to work 10/31/19.        Sincerely,        Kesha Mcneil PA-C    [place] [facility]

## 2019-10-29 NOTE — PATIENT INSTRUCTIONS
(R05) Cough  (primary encounter diagnosis)  Comment:   Plan: XR Chest 2 Views, doxycycline hyclate         (VIBRAMYCIN) 100 MG capsule            (R06.2) Wheezing  Comment:   Plan: predniSONE (DELTASONE) 20 MG tablet          Follow up with primary clinic should symptoms persist or worsen.

## 2019-10-29 NOTE — PROGRESS NOTES
Patient presents with:  Urgent Care  Sinus Problem: Pt states  bronchitis, sinus sxs was seen on friday     SUBJECTIVE:   Shannen Evans is a 39 year old female presenting with a chief complaint of   1) persistent congestion and wheezing in chest.  Sinus congestion has improved since taking Zithromax, she just took her 5th day of the medication.  Denies fevers currently.   Using the albuterol inhaler with some relief.      Predisposing factors include: family members all ill with similar symptoms (kimberly spouse, sons Damaso and Chad)    Past Medical History:   Diagnosis Date     Cholelithiasis 2010     Gestational diabetes     diet controlled     Hyperlipidemia LDL goal <160 10/31/2010     Moderate major depression (H) 2019     Morbid obesity (H) 10/11/2017     Vitamin D deficiency 2014     Patient Active Problem List   Diagnosis     HYPERLIPIDEMIA LDL GOAL <160     Vitamin D deficiency     Morbid obesity (H)     Moderate major depression (H)     Social History     Tobacco Use     Smoking status: Former Smoker     Types: Cigarettes     Last attempt to quit: 2011     Years since quittin.3     Smokeless tobacco: Never Used   Substance Use Topics     Alcohol use: No       ROS:  CONSTITUTIONAL:NEGATIVE for fever, chills, change in weight  INTEGUMENTARY/SKIN: NEGATIVE for worrisome rashes, moles or lesions  EYES: NEGATIVE for vision changes or irritation  ENT/MOUTH: as per HPI  RESP:as per HPI  CV: NEGATIVE for chest pain, palpitations or peripheral edema  GI: NEGATIVE for nausea, abdominal pain, heartburn, or change in bowel habits  : negative  MUSCULOSKELETAL: NEGATIVE for significant arthralgias or myalgia  Review of systems negative except as stated above.    OBJECTIVE  :/82   Pulse 64   Temp 97.1  F (36.2  C) (Oral)   Resp 16   SpO2 97%   GENERAL APPEARANCE: healthy, alert and no distress  EYES: EOMI,  PERRL, conjunctiva clear  HENT: ear canals and TM's normal.  Nose and  mouth without ulcers, erythema or lesions  HENT: rhinorrhea white  NECK: supple, nontender, no lymphadenopathy  RESP: la few scattered wheezes/rhonchi which clear with fabiana  CV: regular rates and rhythm, normal S1 S2, no murmur noted  NEURO: Normal strength and tone, sensory exam grossly normal,  normal speech and mentation  SKIN: no suspicious lesions or rashes     CXR: No infiltrates or masses as per my interpretation during clinic visit today.      (R05) Cough  (primary encounter diagnosis)  Comment:   Plan: XR Chest 2 Views, doxycycline hyclate         (VIBRAMYCIN) 100 MG capsule  (patient to wait 5 days, if having persistent symptoms, will start Doxy and take as directed.             (R06.2) Wheezing  Comment:   Plan: predniSONE (DELTASONE) 20 MG tablet          Follow up with primary clinic should symptoms persist or worsen.

## 2019-11-06 ENCOUNTER — HEALTH MAINTENANCE LETTER (OUTPATIENT)
Age: 39
End: 2019-11-06

## 2020-02-10 DIAGNOSIS — F32.1 MODERATE MAJOR DEPRESSION (H): ICD-10-CM

## 2020-02-10 RX ORDER — DESVENLAFAXINE 50 MG/1
50 TABLET, FILM COATED, EXTENDED RELEASE ORAL DAILY
Qty: 30 TABLET | Refills: 0 | Status: SHIPPED | OUTPATIENT
Start: 2020-02-10 | End: 2020-04-27

## 2020-02-10 NOTE — LETTER
[facility]  [address] [id number] [phone]            Shannen Evans  [address]  [facility] [address]        February 10, 2020    Dear Shannen,    While refilling your prescription today, we noticed that you are due for an appointment with your provider.  We will refill your prescription for 30 days, but a follow-up appointment must be made before any additional refills can be approved.     Taking care of your health is important to us and we look forward to seeing you in the near future.  Please call us at [phone] or [phone] (or use [facility]) to schedule an appointment.     Please disregard this notice if you have already made an appointment.    Sincerely,        [facility]

## 2020-02-10 NOTE — TELEPHONE ENCOUNTER
"Requested Prescriptions   Pending Prescriptions Disp Refills     desvenlafaxine (PRISTIQ) 50 MG 24 hr tablet 90 tablet 3     Sig: Take 1 tablet (50 mg) by mouth daily       Serotonin-Norepinephrine Reuptake Inhibitors  Failed - 2/10/2020  9:37 AM        Failed - PHQ-9 score of less than 5 in past 6 months     Please review last PHQ-9 score.           Failed - Normal serum creatinine on file in past 12 months     Recent Labs   Lab Test 02/05/19  0908   CR 0.70             Failed - Recent (6 mo) or future (30 days) visit within the authorizing provider's specialty     Patient had office visit in the last 6 months or has a visit in the next 30 days with authorizing provider or within the authorizing provider's specialty.  See \"Patient Info\" tab in inbasket, or \"Choose Columns\" in Meds & Orders section of the refill encounter.            Passed - Blood pressure under 140/90 in past 12 months     BP Readings from Last 3 Encounters:   10/29/19 135/82   10/25/19 130/80   06/18/19 124/62                 Passed - Medication is active on med list        Passed - Patient is age 18 or older        Passed - No active pregnancy on record        Passed - No positive pregnancy test in past 12 months        Last Written Prescription Date:  02/05/2019  Last Fill Quantity: 90,  # refills: 3   Last office visit: 2/5/2019 with prescribing provider:  Dr Francis   Future Office Visit:      "

## 2020-04-23 DIAGNOSIS — F32.1 MODERATE MAJOR DEPRESSION (H): ICD-10-CM

## 2020-04-27 ENCOUNTER — VIRTUAL VISIT (OUTPATIENT)
Dept: INTERNAL MEDICINE | Facility: CLINIC | Age: 40
End: 2020-04-27
Payer: COMMERCIAL

## 2020-04-27 VITALS — WEIGHT: 273 LBS | BODY MASS INDEX: 41.51 KG/M2

## 2020-04-27 DIAGNOSIS — E66.01 MORBID OBESITY (H): ICD-10-CM

## 2020-04-27 DIAGNOSIS — F32.1 MODERATE MAJOR DEPRESSION (H): ICD-10-CM

## 2020-04-27 PROCEDURE — 99214 OFFICE O/P EST MOD 30 MIN: CPT | Mod: TEL | Performed by: INTERNAL MEDICINE

## 2020-04-27 RX ORDER — DESVENLAFAXINE 50 MG/1
50 TABLET, FILM COATED, EXTENDED RELEASE ORAL DAILY
Qty: 90 TABLET | Refills: 2 | Status: SHIPPED | OUTPATIENT
Start: 2020-04-27 | End: 2021-01-08

## 2020-04-27 ASSESSMENT — ANXIETY QUESTIONNAIRES
6. BECOMING EASILY ANNOYED OR IRRITABLE: NOT AT ALL
7. FEELING AFRAID AS IF SOMETHING AWFUL MIGHT HAPPEN: NOT AT ALL
1. FEELING NERVOUS, ANXIOUS, OR ON EDGE: NOT AT ALL
3. WORRYING TOO MUCH ABOUT DIFFERENT THINGS: NOT AT ALL
5. BEING SO RESTLESS THAT IT IS HARD TO SIT STILL: NOT AT ALL
GAD7 TOTAL SCORE: 0
2. NOT BEING ABLE TO STOP OR CONTROL WORRYING: NOT AT ALL
IF YOU CHECKED OFF ANY PROBLEMS ON THIS QUESTIONNAIRE, HOW DIFFICULT HAVE THESE PROBLEMS MADE IT FOR YOU TO DO YOUR WORK, TAKE CARE OF THINGS AT HOME, OR GET ALONG WITH OTHER PEOPLE: NOT DIFFICULT AT ALL

## 2020-04-27 ASSESSMENT — PATIENT HEALTH QUESTIONNAIRE - PHQ9
SUM OF ALL RESPONSES TO PHQ QUESTIONS 1-9: 0
5. POOR APPETITE OR OVEREATING: NOT AT ALL

## 2020-04-27 NOTE — PROGRESS NOTES
"Shannen Evans is a 40 year old female who is being evaluated via a billable telephone visit.      The patient has been notified of following:     \"This telephone visit will be conducted via a call between you and your physician/provider. We have found that certain health care needs can be provided without the need for a physical exam.  This service lets us provide the care you need with a short phone conversation.  If a prescription is necessary we can send it directly to your pharmacy.  If lab work is needed we can place an order for that and you can then stop by our lab to have the test done at a later time.    Telephone visits are billed at different rates depending on your insurance coverage. During this emergency period, for some insurers they may be billed the same as an in-person visit.  Please reach out to your insurance provider with any questions.    If during the course of the call the physician/provider feels a telephone visit is not appropriate, you will not be charged for this service.\"    Patient has given verbal consent for Telephone visit?  Yes    How would you like to obtain your AVS? MyChart    Subjective     Shannen Evans is a 40 year old female who presents to clinic today for the following health issues:    HPI  Depression Followup    How are you doing with your depression since your last visit? Improved -Rx is helping    Are you having other symptoms that might be associated with depression? No    Have you had a significant life event?  No     Are you feeling anxious or having panic attacks?   No    Do you have any concerns with your use of alcohol or other drugs? No    Social History     Tobacco Use     Smoking status: Former Smoker     Types: Cigarettes     Last attempt to quit: 2011     Years since quittin.8     Smokeless tobacco: Never Used   Substance Use Topics     Alcohol use: No     Drug use: No     PHQ 10/10/2017 2019 2020   PHQ-9 Total Score 9 7 0   Q9: " Thoughts of better off dead/self-harm past 2 weeks Not at all Not at all Not at all     ROXANA-7 SCORE 4/27/2020   Total Score 0     Last PHQ-9 4/27/2020   1.  Little interest or pleasure in doing things 0   2.  Feeling down, depressed, or hopeless 0   3.  Trouble falling or staying asleep, or sleeping too much 0   4.  Feeling tired or having little energy 0   5.  Poor appetite or overeating 0   6.  Feeling bad about yourself 0   7.  Trouble concentrating 0   8.  Moving slowly or restless 0   Q9: Thoughts of better off dead/self-harm past 2 weeks 0   PHQ-9 Total Score 0   Difficulty at work, home, or with people Not difficult at all         Suicide Assessment Five-step Evaluation and Treatment (SAFE-T)      How many servings of fruits and vegetables do you eat daily?  2-3    On average, how many sweetened beverages do you drink each day (Examples: soda, juice, sweet tea, etc.  Do NOT count diet or artificially sweetened beverages)?   1    How many days per week do you exercise enough to make your heart beat faster? 3 or less    How many minutes a day do you exercise enough to make your heart beat faster? 9 or less    How many days per week do you miss taking your medication? 0    Wt 123.8 kg (273 lb)   BMI 41.51 kg/m        Due to the current impact of the Covid19 virus and recommendations by [facility], CDC, etc to limit  clinic visits and pt exposure risk, was recommend pt proceed with virtual phone visit to address acute/stable  medical needs with plan to defer other non-acute health maintenance issues/exam to a future date when less self-isolation is required.    I have reviewed the nursing note as documented above.   See below for other information/data  and my personal notes capturing the substance of my conversation with the patient.       HPI:    Pt on Pristiq daily.  Patient states medication is working well for her.  PHQ = 0 currently.  Patient also seeing a therapist once a week.  Wishes to continue  the medication for mental health.  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  Patient is due for Pap/pelvic examination.  She is followed by Dr. Caballero from OB GYN.  Patient states she will be scheduling appointment with her later this summer.  Patient states her weight has been steady with history of obesity.  Denies chest pain, shortness of breath, abdominal pain.  Not exercising much.  Moderate carbohydrates in her diet.     Additional ROS:   Constitutional, HEENT, Cardiovascular, Pulmonary, GI and , Neuro, MSK and Psych review of systems/symptoms are otherwise negative or unchanged from previous, except as noted above.      ASSESSMENT:   1. Moderate major depression (H)  Well-controlled.  Continue current medication along with counseling therapy.  Will follow-up in 6 months  - desvenlafaxine (PRISTIQ) 50 MG 24 hr tablet; Take 1 tablet (50 mg) by mouth daily  Dispense: 90 tablet; Refill: 2    2. Morbid obesity (H)  Needs improved control.  Previous history of mild lipid elevation.  Patient counseled regarding diet and exercise.  Will have repeat fasting labs done in 6 months      PLAN:  Continue current medications.  Refill done  Follow-up with me in 6 months or earlier as needed.  Come fasting for the clinic appointment so screening labs can be done at that time. For fasting labs, please refrain from eating for 8 hours or more.  Be sure to  drink water and take your  medications the day of the test.   Follow-up with Dr. Caballero for pap/pelvic exam later this year.  Asked her to fax the  results of the Pap smear to our clinic [phone]  Reduce calorie/carbohydrate (sugar, bread, potato, pasta, rice, alcohol etc)  intake in diet.  Increase color on your plate with fruits and vegetables. Increase  frequency of walking or other aerobic exercise as able (goal is daily)    Phone call contact time  Call Started at 4:28pm  Call Ended at 4:38  pm  Total minutes: 10 min    (Chart documentation was completed, in part, with [employer] voice-recognition software. Even though reviewed, some grammatical, spelling, and word errors may remain.)    Kevin Francis MD  Internal Medicine Department  [facility]

## 2020-04-28 RX ORDER — DESVENLAFAXINE 50 MG/1
TABLET, FILM COATED, EXTENDED RELEASE ORAL
Qty: 30 TABLET | Refills: 0 | OUTPATIENT
Start: 2020-04-28

## 2020-04-28 ASSESSMENT — ANXIETY QUESTIONNAIRES: GAD7 TOTAL SCORE: 0

## 2020-04-28 NOTE — PATIENT INSTRUCTIONS
Continue current medications.  Refill done  Follow-up with me in 6 months or earlier as needed.  Come fasting for the clinic appointment so screening labs can be done at that time. For fasting labs, please refrain from eating for 8 hours or more.  Be sure to  drink water and take your  medications the day of the test.   Follow-up with Dr. Caballero for pap/pelvic exam later this year.  Asked her to fax the  results of the Pap smear to our clinic [phone]  Reduce calorie/carbohydrate (sugar, bread, potato, pasta, rice, alcohol etc)  intake in diet.  Increase color on your plate with fruits and vegetables. Increase  frequency of walking or other aerobic exercise as able (goal is daily)

## 2020-09-08 ENCOUNTER — OFFICE VISIT (OUTPATIENT)
Dept: INTERNAL MEDICINE | Facility: CLINIC | Age: 40
End: 2020-09-08
Payer: COMMERCIAL

## 2020-09-08 ENCOUNTER — TELEPHONE (OUTPATIENT)
Dept: INTERNAL MEDICINE | Facility: CLINIC | Age: 40
End: 2020-09-08

## 2020-09-08 VITALS
HEART RATE: 72 BPM | DIASTOLIC BLOOD PRESSURE: 84 MMHG | BODY MASS INDEX: 42.01 KG/M2 | HEIGHT: 68 IN | OXYGEN SATURATION: 100 % | WEIGHT: 277.2 LBS | TEMPERATURE: 98.2 F | SYSTOLIC BLOOD PRESSURE: 124 MMHG

## 2020-09-08 DIAGNOSIS — R10.2 SUPRAPUBIC ABDOMINAL PAIN: Primary | ICD-10-CM

## 2020-09-08 LAB
ALBUMIN UR-MCNC: NEGATIVE MG/DL
APPEARANCE UR: CLEAR
BACTERIA #/AREA URNS HPF: ABNORMAL /HPF
BILIRUB UR QL STRIP: NEGATIVE
COLOR UR AUTO: YELLOW
ERYTHROCYTE [DISTWIDTH] IN BLOOD BY AUTOMATED COUNT: 12.4 % (ref 10–15)
GLUCOSE UR STRIP-MCNC: NEGATIVE MG/DL
HCT VFR BLD AUTO: 38.7 % (ref 35–47)
HGB BLD-MCNC: 12.7 G/DL (ref 11.7–15.7)
HGB UR QL STRIP: ABNORMAL
KETONES UR STRIP-MCNC: NEGATIVE MG/DL
LEUKOCYTE ESTERASE UR QL STRIP: NEGATIVE
MCH RBC QN AUTO: 29.5 PG (ref 26.5–33)
MCHC RBC AUTO-ENTMCNC: 32.8 G/DL (ref 31.5–36.5)
MCV RBC AUTO: 90 FL (ref 78–100)
NITRATE UR QL: NEGATIVE
NON-SQ EPI CELLS #/AREA URNS LPF: ABNORMAL /LPF
PH UR STRIP: 5 PH (ref 5–7)
PLATELET # BLD AUTO: 202 10E9/L (ref 150–450)
RBC # BLD AUTO: 4.31 10E12/L (ref 3.8–5.2)
RBC #/AREA URNS AUTO: ABNORMAL /HPF
SOURCE: ABNORMAL
SP GR UR STRIP: >1.03 (ref 1–1.03)
UROBILINOGEN UR STRIP-ACNC: 0.2 EU/DL (ref 0.2–1)
WBC # BLD AUTO: 9.3 10E9/L (ref 4–11)
WBC #/AREA URNS AUTO: ABNORMAL /HPF

## 2020-09-08 PROCEDURE — 90686 IIV4 VACC NO PRSV 0.5 ML IM: CPT | Performed by: INTERNAL MEDICINE

## 2020-09-08 PROCEDURE — 85027 COMPLETE CBC AUTOMATED: CPT | Performed by: INTERNAL MEDICINE

## 2020-09-08 PROCEDURE — 81001 URINALYSIS AUTO W/SCOPE: CPT | Performed by: INTERNAL MEDICINE

## 2020-09-08 PROCEDURE — 36415 COLL VENOUS BLD VENIPUNCTURE: CPT | Performed by: INTERNAL MEDICINE

## 2020-09-08 PROCEDURE — 99214 OFFICE O/P EST MOD 30 MIN: CPT | Mod: 25 | Performed by: INTERNAL MEDICINE

## 2020-09-08 PROCEDURE — 90471 IMMUNIZATION ADMIN: CPT | Performed by: INTERNAL MEDICINE

## 2020-09-08 ASSESSMENT — MIFFLIN-ST. JEOR: SCORE: 1980.87

## 2020-09-08 NOTE — TELEPHONE ENCOUNTER
We would just order the u/s ourselves unless she sees her gyn first  Labs normal. Ill order u/s at [facility]

## 2020-09-08 NOTE — PROGRESS NOTES
"Subjective     Shannen Evans is a 40 year old female who presents to clinic today for the following health issues:    HPI       Abdominal/Flank Pain  Onset/Duration: bad bout this morning at 3am, intermittently over the past few weeks  Description:   Character: Gnawing  Location: suprapubic region pelvic region  Radiation: None  Intensity: 8/10  Progression of Symptoms:  same  Accompanying Signs & Symptoms:  Fever/Chills: no  Gas/Bloating: no  Nausea: no  Vomitting: no  Diarrhea: no  Constipation: YES- one incident. Had normal BM 2 yrs prior to.   Dysuria or Hematuria: no  History:   Trauma: no  Previous similar pain: no  Previous tests done: none  Precipitating factors:   Does the pain change with:     Food: no    Bowel Movement: no    Urination: YES- tends to start after urination.     Other factors:  no  Therapies tried and outcome: ibuprofen, usually with improvement  08/18/2020-approximate  Denies any chance she could be pregnant    Review of Systems   Constitutional, HEENT, cardiovascular, pulmonary, gi and gu systems are negative, except as otherwise noted.      Objective    /84   Pulse 72   Temp 98.2  F (36.8  C) (Temporal)   Ht 1.727 m (5' 8\")   Wt 125.7 kg (277 lb 3.2 oz)   LMP 08/18/2020 (Approximate)   SpO2 100%   BMI 42.15 kg/m    Body mass index is 42.15 kg/m .  Physical Exam   GENERAL APPEARANCE: alert, no distress and over weight  HENT: nose and mouth without ulcers or lesions and normal cephalic/atraumatic  NECK: no adenopathy, no asymmetry, masses, or scars and thyroid normal to palpation  RESP: lungs clear to auscultation - no rales, rhonchi or wheezes  CV: regular rates and rhythm, normal S1 S2, no S3 or S4 and no murmur, click or rub  ABDOMEN: soft, nontender, without hepatosplenomegaly or masses and bowel sounds normal    Results for orders placed or performed in visit on 09/08/20 (from the past 24 hour(s))   *UA reflex to Microscopic and Culture ([facility], [place] " "(except [place] and [place])    Specimen: Midstream Urine   Result Value Ref Range    Color Urine Yellow     Appearance Urine Clear     Glucose Urine Negative NEG^Negative mg/dL    Bilirubin Urine Negative NEG^Negative    Ketones Urine Negative NEG^Negative mg/dL    Specific Gravity Urine >1.030 1.003 - 1.035    Blood Urine Small (A) NEG^Negative    pH Urine 5.0 5.0 - 7.0 pH    Protein Albumin Urine Negative NEG^Negative mg/dL    Urobilinogen Urine 0.2 0.2 - 1.0 EU/dL    Nitrite Urine Negative NEG^Negative    Leukocyte Esterase Urine Negative NEG^Negative    Source Midstream Urine    CBC with platelets   Result Value Ref Range    WBC 9.3 4.0 - 11.0 10e9/L    RBC Count 4.31 3.8 - 5.2 10e12/L    Hemoglobin 12.7 11.7 - 15.7 g/dL    Hematocrit 38.7 35.0 - 47.0 %    MCV 90 78 - 100 fl    MCH 29.5 26.5 - 33.0 pg    MCHC 32.8 31.5 - 36.5 g/dL    RDW 12.4 10.0 - 15.0 %    Platelet Count 202 150 - 450 10e9/L   Urine Microscopic   Result Value Ref Range    WBC Urine 0 - 5 OTO5^0 - 5 /HPF    RBC Urine O - 2 OTO2^O - 2 /HPF    Squamous Epithelial /LPF Urine Few FEW^Few /LPF    Bacteria Urine Few (A) NEG^Negative /HPF           Assessment & Plan     Suprapubic abdominal pain  Currently pain w/exam but otherwise quite comfortable - nothing felt since this am. No GI or  sx nor evidence of pathology on lab work. rec'd pelvic u/s initially.  Diff dx- pelvic/gyn , less likely GI . If pain persists and u/s neg consider CY abd/pelvis.  - *UA reflex to Microscopic and Culture ([place] [facility] (except [place] and [place])  - CBC with platelets  - Urine Microscopic  - US Pelvic Complete with Transvaginal; Future     BMI:   Estimated body mass index is 42.15 kg/m  as calculated from the following:    Height as of this encounter: 1.727 m (5' 8\").    Weight as of this encounter: 125.7 kg (277 lb 3.2 oz).     See Patient Instructions    No follow-ups on file.    Mike Arce MD  [facility] " [facility]

## 2020-09-08 NOTE — NURSING NOTE
"Chief Complaint   Patient presents with     Abdominal Pain     /84   Pulse 72   Temp 98.2  F (36.8  C) (Temporal)   Ht 1.727 m (5' 8\")   Wt 125.7 kg (277 lb 3.2 oz)   LMP 08/18/2020 (Approximate)   SpO2 100%   BMI 42.15 kg/m   Estimated body mass index is 42.15 kg/m  as calculated from the following:    Height as of this encounter: 1.727 m (5' 8\").    Weight as of this encounter: 125.7 kg (277 lb 3.2 oz).        Health Maintenance due pending provider review:  Pap  Pt aware due for phx, states will schedule with Dr. Caballero, Ob/gyn specialists    Shaunna Lynne CMA  "

## 2020-09-08 NOTE — TELEPHONE ENCOUNTER
Patient called back clinic stating she missed a phone call. I don't see any calls placed. Instructed labs are back and WBC is normal. Provider to review labs. Also, pt called her [facility] and they are able to do her pelvic ultrasound if they have a faxed order to:    [place]  [address] [facility] [address]  [place] [address]  PHONE [phone]  FAX [phone]

## 2020-10-16 ENCOUNTER — HOSPITAL ENCOUNTER (OUTPATIENT)
Facility: CLINIC | Age: 40
End: 2020-10-16
Attending: OBSTETRICS & GYNECOLOGY | Admitting: OBSTETRICS & GYNECOLOGY
Payer: COMMERCIAL

## 2020-10-16 DIAGNOSIS — Z11.59 ENCOUNTER FOR SCREENING FOR OTHER VIRAL DISEASES: Primary | ICD-10-CM

## 2020-10-22 RX ORDER — CEFAZOLIN SODIUM 1 G/3ML
1 INJECTION, POWDER, FOR SOLUTION INTRAMUSCULAR; INTRAVENOUS SEE ADMIN INSTRUCTIONS
Status: CANCELLED | OUTPATIENT
Start: 2020-10-22

## 2020-10-22 RX ORDER — CEFAZOLIN SODIUM IN 0.9 % NACL 3 G/100 ML
3 INTRAVENOUS SOLUTION, PIGGYBACK (ML) INTRAVENOUS
Status: CANCELLED | OUTPATIENT
Start: 2020-10-22

## 2020-10-29 ENCOUNTER — HOSPITAL PATHOLOGY (OUTPATIENT)
Dept: OTHER | Facility: CLINIC | Age: 40
End: 2020-10-29

## 2020-10-30 LAB — COPATH REPORT: NORMAL

## 2020-11-29 ENCOUNTER — HEALTH MAINTENANCE LETTER (OUTPATIENT)
Age: 40
End: 2020-11-29

## 2020-12-23 ENCOUNTER — OFFICE VISIT (OUTPATIENT)
Dept: URGENT CARE | Facility: URGENT CARE | Age: 40
End: 2020-12-23
Payer: COMMERCIAL

## 2020-12-23 ENCOUNTER — ANCILLARY PROCEDURE (OUTPATIENT)
Dept: GENERAL RADIOLOGY | Facility: CLINIC | Age: 40
End: 2020-12-23
Attending: PHYSICIAN ASSISTANT
Payer: COMMERCIAL

## 2020-12-23 VITALS
DIASTOLIC BLOOD PRESSURE: 71 MMHG | HEART RATE: 67 BPM | TEMPERATURE: 98.2 F | SYSTOLIC BLOOD PRESSURE: 114 MMHG | OXYGEN SATURATION: 97 % | RESPIRATION RATE: 19 BRPM

## 2020-12-23 DIAGNOSIS — R07.81 RIB PAIN ON LEFT SIDE: Primary | ICD-10-CM

## 2020-12-23 PROCEDURE — 71101 X-RAY EXAM UNILAT RIBS/CHEST: CPT | Mod: LT | Performed by: RADIOLOGY

## 2020-12-23 PROCEDURE — 99214 OFFICE O/P EST MOD 30 MIN: CPT | Performed by: PHYSICIAN ASSISTANT

## 2020-12-23 RX ORDER — HYDROCODONE BITARTRATE AND ACETAMINOPHEN 5; 325 MG/1; MG/1
1 TABLET ORAL EVERY 6 HOURS PRN
Qty: 10 TABLET | Refills: 0 | Status: SHIPPED | OUTPATIENT
Start: 2020-12-23 | End: 2020-12-26

## 2020-12-23 NOTE — PROGRESS NOTES
Patient presents with:  Urgent Care: left rib cage pain/injury from slipping on ice two days ago.    SUBJECTIVE:  Chief Complaint   Patient presents with     Urgent Care     left rib cage pain/injury from slipping on ice two days ago.     Shannen Evans is a 40 year old female presents with a chief complaint of left sided rib pain since she slipped and fell onto her left side 2 days ago.  Denies any other injury.  Denies any head injury.  Minor injury (abrasion) to left forearm.    Pain is worse with movement and deep breathing.     Denies any cough or fevers.      She is otherwise in her usual state of health.   Taking ibuprofen without relief.    SH: wife is Candice (Usha), stanley Petit and Chad     Past Medical History:   Diagnosis Date     Cholelithiasis 2010     Gestational diabetes     diet controlled     Hyperlipidemia LDL goal <160 10/31/2010     Moderate major depression (H) 2019     Morbid obesity (H) 10/11/2017     Vitamin D deficiency 2014     Patient Active Problem List   Diagnosis     HYPERLIPIDEMIA LDL GOAL <160     Vitamin D deficiency     Morbid obesity (H)     Moderate major depression (H)     Social History     Tobacco Use     Smoking status: Former Smoker     Types: Cigarettes     Quit date: 2011     Years since quittin.5     Smokeless tobacco: Never Used   Substance Use Topics     Alcohol use: No       ROS:  CONSTITUTIONAL:NEGATIVE for fever, chills, change in weight  INTEGUMENTARY/SKIN: NEGATIVE for worrisome rashes, moles or lesions  EYES: NEGATIVE for vision changes or irritation  ENT/MOUTH: NEGATIVE for ear, mouth and throat problems  RESP:as per hPI  CV: NEGATIVE for chest pain, palpitations or peripheral edema  : negative (recent hysterectomy)  MUSCULOSKELETAL: as per HPI  NEURO: NEGATIVE for weakness, dizziness or paresthesias  Review of systems negative except as stated above.    EXAM:   /71   Pulse 67   Temp 98.2  F (36.8  C) (Temporal)   Resp 19    SpO2 97%   Gen: healthy,alert,no distress  Extremity: left arm: superficial abrasion, non tender.     There is not compromise to the distal circulation.  Pulses are +2 and CRT is brisk  GENERAL APPEARANCE: healthy, alert and no distress  CHEST: clear to auscultation  CV: regular rate and rhythm  Ribs: left sided lateral mid rib tenderness to palpation  EXTREMITIES: peripheral pulses normal  SKIN: no suspicious lesions or rashes  NEURO: Normal strength and tone, sensory exam grossly normal, mentation intact and speech normal    X-RAY was done.  No fracture as per my interpretation    (R07.81) Rib pain on left side  (primary encounter diagnosis)  Comment:   Plan: XR Ribs & Chest Left G/E 3 Views,         HYDROcodone-acetaminophen (NORCO) 5-325 MG         tablet        Use vicodin with caution.

## 2020-12-23 NOTE — LETTER
[facility]RGENT [facility]  [address] [address]  [phone]      December 23, 2020    RE:  Shannen Evans                                                                                                                                                       [address] [facility] [address]            To whom it may concern:    Shannen Evans was seen in clinic today for an injury.  She may return to work on her next scheduled work day after tomorrow.        Sincerely,        Kesha Mcneil PA-C    [facility]

## 2021-01-07 DIAGNOSIS — F32.1 MODERATE MAJOR DEPRESSION (H): ICD-10-CM

## 2021-01-08 RX ORDER — DESVENLAFAXINE 50 MG/1
50 TABLET, FILM COATED, EXTENDED RELEASE ORAL DAILY
Qty: 90 TABLET | Refills: 0 | Status: SHIPPED | OUTPATIENT
Start: 2021-01-08 | End: 2021-05-10 | Stop reason: DRUGHIGH

## 2021-01-09 NOTE — TELEPHONE ENCOUNTER
RF done for 90 days. Pt instructed in [facility] to see me in 3 mos and will do PHQ at that time. Previous PHQ at goal

## 2021-04-21 ENCOUNTER — MYC MEDICAL ADVICE (OUTPATIENT)
Dept: INTERNAL MEDICINE | Facility: CLINIC | Age: 41
End: 2021-04-21

## 2021-04-21 DIAGNOSIS — Z00.00 LABORATORY EXAMINATION ORDERED AS PART OF A ROUTINE GENERAL MEDICAL EXAMINATION: Primary | ICD-10-CM

## 2021-05-10 ENCOUNTER — OFFICE VISIT (OUTPATIENT)
Dept: INTERNAL MEDICINE | Facility: CLINIC | Age: 41
End: 2021-05-10
Payer: COMMERCIAL

## 2021-05-10 VITALS
HEART RATE: 74 BPM | SYSTOLIC BLOOD PRESSURE: 124 MMHG | TEMPERATURE: 97.9 F | HEIGHT: 68 IN | RESPIRATION RATE: 16 BRPM | BODY MASS INDEX: 41.37 KG/M2 | DIASTOLIC BLOOD PRESSURE: 72 MMHG | OXYGEN SATURATION: 97 % | WEIGHT: 273 LBS

## 2021-05-10 DIAGNOSIS — E66.01 MORBID OBESITY (H): ICD-10-CM

## 2021-05-10 DIAGNOSIS — Z00.01 ENCOUNTER FOR ROUTINE ADULT MEDICAL EXAM WITH ABNORMAL FINDINGS: ICD-10-CM

## 2021-05-10 DIAGNOSIS — Z12.31 ENCOUNTER FOR SCREENING MAMMOGRAM FOR BREAST CANCER: ICD-10-CM

## 2021-05-10 DIAGNOSIS — F32.1 MODERATE MAJOR DEPRESSION (H): Primary | ICD-10-CM

## 2021-05-10 PROCEDURE — 99396 PREV VISIT EST AGE 40-64: CPT | Performed by: INTERNAL MEDICINE

## 2021-05-10 PROCEDURE — 99213 OFFICE O/P EST LOW 20 MIN: CPT | Mod: 25 | Performed by: INTERNAL MEDICINE

## 2021-05-10 PROCEDURE — 96127 BRIEF EMOTIONAL/BEHAV ASSMT: CPT | Performed by: INTERNAL MEDICINE

## 2021-05-10 RX ORDER — DESVENLAFAXINE 100 MG/1
100 TABLET, EXTENDED RELEASE ORAL DAILY
Qty: 30 TABLET | Refills: 11 | Status: SHIPPED | OUTPATIENT
Start: 2021-05-10 | End: 2021-06-18

## 2021-05-10 ASSESSMENT — MIFFLIN-ST. JEOR: SCORE: 1956.82

## 2021-05-10 ASSESSMENT — PATIENT HEALTH QUESTIONNAIRE - PHQ9: SUM OF ALL RESPONSES TO PHQ QUESTIONS 1-9: 4

## 2021-05-10 NOTE — PATIENT INSTRUCTIONS
Increase Pristiq to 100mg  Tablet, 1 tab daily for mental health  Update me re: mood in 3-4 weeks with Mychart note   Future lab appointment  fasting in 1-2 weeks.   For fasting labs, please refrain from eating for 8 hours or more.   Drink 2 glasses of water before your lab appointment. It is fine to take your  oral medications on the morning of the lab test as usual  Reduce calorie/carbohydrate (sugar, bread, potato, pasta, rice, alcohol etc)  intake in diet.  Increase color on your plate with fruits and vegetables. Increase  frequency of walking or other aerobic exercise as able (goal is daily)  Mammogram     This will be done at the [facility].    Moisturizer such as Lubriderm/Vanicream as needed for dry skin of the hands  Pt was informed regarding extra E&M billing for management of new or established medical issues not related to today's wellness visit

## 2021-05-10 NOTE — PROGRESS NOTES
SUBJECTIVE:   CC: Shannen Evans is an 41 year old woman who presents for preventive health visit and follow-up regarding depression.     {  Healthy Habits:     Getting at least 3 servings of Calcium per day:  Yes    Bi-annual eye exam:  NO    Dental care twice a year:  Yes    Sleep apnea or symptoms of sleep apnea:  None    Diet:  Regular (no restrictions)    Frequency of exercise:  2-3 days/week    Duration of exercise:  30-45 minutes    Taking medications regularly:  Yes    Medication side effects:  None    PHQ-2 Total Score: 2    Additional concerns today:  Yes (Pristiq-Dose Change)        Today's PHQ-2 Score:   PHQ-2 (  [employer]) 5/10/2021   Q1: Little interest or pleasure in doing things 1   Q2: Feeling down, depressed or hopeless 1   PHQ-2 Score 2   Q1: Little interest or pleasure in doing things Several days   Q2: Feeling down, depressed or hopeless Several days   PHQ-2 Score 2       Abuse: Current or Past (Physical, Sexual or Emotional) - No  Do you feel safe in your environment? Yes      Social History     Tobacco Use     Smoking status: Former Smoker     Types: Cigarettes     Quit date: 2011     Years since quittin.8     Smokeless tobacco: Never Used   Substance Use Topics     Alcohol use: No     If you drink alcohol do you typically have >3 drinks per day or >7 drinks per week? No    Alcohol Use 5/10/2021   Prescreen: >3 drinks/day or >7 drinks/week? No   Prescreen: >3 drinks/day or >7 drinks/week? -       Reviewed orders with patient.  Reviewed health maintenance and updated orders accordingly - Yes  Labs reviewed in EPIC    Breast Cancer Screening:  Any new diagnosis of family breast, ovarian, or bowel cancer? No    FSH-7: No flowsheet data found.    Mammogram Screening - Offered annual screening and updated Health Maintenance for mutual plan based on risk factor consideration    Pertinent mammograms are reviewed under the imaging tab.    History of abnormal Pap smear: Status post  "benign hysterectomy. Health Maintenance and Surgical History updated.     Reviewed and updated as needed this visit by clinical staff   Allergies  Meds              Reviewed and updated as needed this visit by Provider                    Review of Systems  CONSTITUTIONAL: NEGATIVE for fever, chills. Weight down 4 pounds  INTEGUMENTARY/SKIN: NEGATIVE for worrisome  moles or lesions. Dryness of hands. Washing often with work  EYES: NEGATIVE for vision changes or irritation. Du for eye exam  ENT: NEGATIVE for ear, mouth and throat problems  RESP: NEGATIVE for significant cough or SOB  BREAST: NEGATIVE for masses, tenderness or discharge  CV: NEGATIVE for chest pain, palpitations or peripheral edema  GI: NEGATIVE for nausea, abdominal pain, heartburn, or change in bowel habits  : NEGATIVE for unusual urinary or vaginal symptoms. No vaginal bleeding.  Status post HERMELINDO.  Pap smear no longer needed  MUSCULOSKELETAL: NEGATIVE for significant arthralgias or myalgia  NEURO: NEGATIVE for weakness, dizziness or paresthesias  PSYCHIATRIC:  POSITIVE for depression. Stress with moving soon to [place]. See PHQ in chart. Wants to try higher dose Pristiq.  No suicidal ideation     OBJECTIVE:   /72   Pulse 74   Temp 97.9  F (36.6  C) (Temporal)   Resp 16   Ht 1.727 m (5' 8\")   Wt 123.8 kg (273 lb)   SpO2 97%   BMI 41.51 kg/m    Physical Exam  General appearance -   alert, no distress.  Normal appearing affect overall  Skin - No rashes or lesions.  Head - normocephalic, atraumatic  Eyes - SELINA, EOMI, fundi exam with nondilated pupils negative.  Ears - External ears normal. Canals clear. TM's normal.  Nose/Sinuses - Nares normal. Septum midline. Mucosa normal. No drainage or sinus tenderness.  Oropharynx - No erythema, no adenopathy, no exudates.  Neck - Supple without adenopathy or thyromegaly. No bruits.  Lungs - Clear to auscultation without wheezes/rhonchi.  Heart - Regular rate and rhythm without murmurs, clicks, or " "gallops.  Nodes - No supraclavicular, axillary, or inguinal adenopathy palpable.  Breasts - deferred  Abdomen - Abdomen soft, obese, non-tender. BS normal. No masses or hepatosplenomegaly palpable. No bruits.  Extremities -No cyanosis, clubbing or edema.    Musculoskeletal - Spine ROM normal. Muscular strength intact.   Peripheral pulses - radial=4/4, femoral=4/4, posterior tibial=4/4, dorsalis pedis=4/4,  Neuro - Gait normal. Reflexes normal and symmetric. Sensation grossly WNL.  Genital/Rectal - deferred          ASSESSMENT/PLAN:   1. Encounter for routine adult medical exam with abnormal findings  Due for screening labs as previously ordered in chart.  Patient make appointment the next couple weeks.  Moisturizer such as Lubriderm/Vanicream as needed for mild skin of hands dryness    2. Moderate major depression (H)   PHQ = 4 but patient feels that mood could be better still.  External stressors include future moving to [place] and parenting 2 children with her wife.  Patient wishes to try higher dose of Pristiq.  Will try 100mg daily dose and reassess in 3 to 4 weeks  - desvenlafaxine (PRISTIQ) 100 MG 24 hr tablet; Take 1 tablet (100 mg) by mouth daily  Dispense: 30 tablet; Refill: 11    3. Encounter for screening mammogram for breast cancer  Due for additional breast cancer screening  - *MA Screening Digital Bilateral; Future    4. Morbid obesity (H)  Weight down a few pounds.  Patient denies daytime fatigue or known snoring.  Counseled regarding calorie/carbohydrate reduction and increase in exercise.  Screening labs ordered in [facility]      Patient has been advised of split billing requirements and indicates understanding: Yes     COUNSELING:  Reviewed preventive health counseling, as reflected in patient instructions    Estimated body mass index is 42.15 kg/m  as calculated from the following:    Height as of 9/8/20: 1.727 m (5' 8\").    Weight as of 9/8/20: 125.7 kg (277 lb 3.2 oz).    Weight management " plan: Discussed healthy diet and exercise guidelines    She reports that she quit smoking about 9 years ago. Her smoking use included cigarettes. She has never used smokeless tobacco.      Counseling Resources:  ATP IV Guidelines  Pooled Cohorts Equation Calculator  Breast Cancer Risk Calculator  BRCA-Related Cancer Risk Assessment: FHS-7 Tool  FRAX Risk Assessment  ICSI Preventive Guidelines  Dietary Guidelines for Americans, 2010  USDA's MyPlate  ASA Prophylaxis  Lung CA Screening      PLAN:  Increase Pristiq to 100mg  Tablet, 1 tab daily for mental health  Update me re: mood in 3-4 weeks with Mychart note   Future lab appointment  fasting in 1-2 weeks.   For fasting labs, please refrain from eating for 8 hours or more.   Drink 2 glasses of water before your lab appointment. It is fine to take your  oral medications on the morning of the lab test as usual  Reduce calorie/carbohydrate (sugar, bread, potato, pasta, rice, alcohol etc)  intake in diet.  Increase color on your plate with fruits and vegetables. Increase  frequency of walking or other aerobic exercise as able (goal is daily)  Mammogram     This will be done at the [facility].    Moisturizer such as Lubriderm/Vanicream as needed for dry skin of the hands  Pt was informed regarding extra E&M billing for management of new or established medical issues not related to today's wellness visit       Kevin Francis MD  [facility]

## 2021-05-17 ENCOUNTER — ANCILLARY PROCEDURE (OUTPATIENT)
Dept: MAMMOGRAPHY | Facility: CLINIC | Age: 41
End: 2021-05-17
Attending: INTERNAL MEDICINE
Payer: COMMERCIAL

## 2021-05-17 DIAGNOSIS — Z00.00 LABORATORY EXAMINATION ORDERED AS PART OF A ROUTINE GENERAL MEDICAL EXAMINATION: ICD-10-CM

## 2021-05-17 DIAGNOSIS — Z12.31 ENCOUNTER FOR SCREENING MAMMOGRAM FOR BREAST CANCER: ICD-10-CM

## 2021-05-17 DIAGNOSIS — Z12.31 VISIT FOR SCREENING MAMMOGRAM: ICD-10-CM

## 2021-05-17 LAB
ALBUMIN SERPL-MCNC: 3.4 G/DL (ref 3.4–5)
ALP SERPL-CCNC: 100 U/L (ref 40–150)
ALT SERPL W P-5'-P-CCNC: 25 U/L (ref 0–50)
ANION GAP SERPL CALCULATED.3IONS-SCNC: 3 MMOL/L (ref 3–14)
AST SERPL W P-5'-P-CCNC: 24 U/L (ref 0–45)
BILIRUB SERPL-MCNC: 0.9 MG/DL (ref 0.2–1.3)
BUN SERPL-MCNC: 13 MG/DL (ref 7–30)
CALCIUM SERPL-MCNC: 8.5 MG/DL (ref 8.5–10.1)
CHLORIDE SERPL-SCNC: 104 MMOL/L (ref 94–109)
CHOLEST SERPL-MCNC: 198 MG/DL
CO2 SERPL-SCNC: 30 MMOL/L (ref 20–32)
CREAT SERPL-MCNC: 0.77 MG/DL (ref 0.52–1.04)
DEPRECATED CALCIDIOL+CALCIFEROL SERPL-MC: 32 UG/L (ref 20–75)
ERYTHROCYTE [DISTWIDTH] IN BLOOD BY AUTOMATED COUNT: 13.1 % (ref 10–15)
GFR SERPL CREATININE-BSD FRML MDRD: >90 ML/MIN/{1.73_M2}
GLUCOSE SERPL-MCNC: 101 MG/DL (ref 70–99)
HCT VFR BLD AUTO: 42.7 % (ref 35–47)
HDLC SERPL-MCNC: 56 MG/DL
HGB BLD-MCNC: 13.8 G/DL (ref 11.7–15.7)
LDLC SERPL CALC-MCNC: 107 MG/DL
MCH RBC QN AUTO: 29.2 PG (ref 26.5–33)
MCHC RBC AUTO-ENTMCNC: 32.3 G/DL (ref 31.5–36.5)
MCV RBC AUTO: 90 FL (ref 78–100)
NONHDLC SERPL-MCNC: 142 MG/DL
PLATELET # BLD AUTO: 238 10E9/L (ref 150–450)
POTASSIUM SERPL-SCNC: 4 MMOL/L (ref 3.4–5.3)
PROT SERPL-MCNC: 7.2 G/DL (ref 6.8–8.8)
RBC # BLD AUTO: 4.73 10E12/L (ref 3.8–5.2)
SODIUM SERPL-SCNC: 137 MMOL/L (ref 133–144)
TRIGL SERPL-MCNC: 177 MG/DL
TSH SERPL DL<=0.005 MIU/L-ACNC: 0.89 MU/L (ref 0.4–4)
WBC # BLD AUTO: 7.7 10E9/L (ref 4–11)

## 2021-05-17 PROCEDURE — 80053 COMPREHEN METABOLIC PANEL: CPT | Performed by: INTERNAL MEDICINE

## 2021-05-17 PROCEDURE — 85027 COMPLETE CBC AUTOMATED: CPT | Performed by: INTERNAL MEDICINE

## 2021-05-17 PROCEDURE — 82306 VITAMIN D 25 HYDROXY: CPT | Performed by: INTERNAL MEDICINE

## 2021-05-17 PROCEDURE — 77063 BREAST TOMOSYNTHESIS BI: CPT | Mod: TC | Performed by: RADIOLOGY

## 2021-05-17 PROCEDURE — 80061 LIPID PANEL: CPT | Performed by: INTERNAL MEDICINE

## 2021-05-17 PROCEDURE — 77067 SCR MAMMO BI INCL CAD: CPT | Mod: TC | Performed by: RADIOLOGY

## 2021-05-17 PROCEDURE — 84443 ASSAY THYROID STIM HORMONE: CPT | Performed by: INTERNAL MEDICINE

## 2021-05-17 PROCEDURE — 36415 COLL VENOUS BLD VENIPUNCTURE: CPT | Performed by: INTERNAL MEDICINE

## 2021-06-17 ENCOUNTER — MYC MEDICAL ADVICE (OUTPATIENT)
Dept: INTERNAL MEDICINE | Facility: CLINIC | Age: 41
End: 2021-06-17

## 2021-06-17 DIAGNOSIS — F32.1 MODERATE MAJOR DEPRESSION (H): ICD-10-CM

## 2021-06-18 RX ORDER — DESVENLAFAXINE 100 MG/1
100 TABLET, EXTENDED RELEASE ORAL DAILY
Qty: 90 TABLET | Refills: 1 | Status: SHIPPED | OUTPATIENT
Start: 2021-06-18 | End: 2022-01-21

## 2021-06-18 NOTE — TELEPHONE ENCOUNTER
Patient requesting 90 day rx through [employer].   Only able to order for total of 6 months from last office visit and PHQ 9.  Current rx in Epic was ordered 5/10/21 #30 with 11 refills.  Routing to Dr. Francis for year of 90 day rx if he approves.  Kesha Olivier RN

## 2021-06-23 DIAGNOSIS — F32.1 MODERATE MAJOR DEPRESSION (H): ICD-10-CM

## 2021-06-23 RX ORDER — DESVENLAFAXINE 50 MG/1
TABLET, FILM COATED, EXTENDED RELEASE ORAL
Qty: 90 TABLET | Refills: 0 | OUTPATIENT
Start: 2021-06-23

## 2021-07-08 ENCOUNTER — MYC MEDICAL ADVICE (OUTPATIENT)
Dept: INTERNAL MEDICINE | Facility: CLINIC | Age: 41
End: 2021-07-08

## 2021-07-08 DIAGNOSIS — Z11.59 SCREENING FOR VIRAL DISEASE: Primary | ICD-10-CM

## 2021-07-09 ENCOUNTER — MYC MEDICAL ADVICE (OUTPATIENT)
Dept: INTERNAL MEDICINE | Facility: CLINIC | Age: 41
End: 2021-07-09

## 2021-09-19 ENCOUNTER — HEALTH MAINTENANCE LETTER (OUTPATIENT)
Age: 41
End: 2021-09-19

## 2021-10-12 ENCOUNTER — TRANSFERRED RECORDS (OUTPATIENT)
Dept: HEALTH INFORMATION MANAGEMENT | Facility: CLINIC | Age: 41
End: 2021-10-12

## 2021-10-19 PROBLEM — F32.9 MAJOR DEPRESSION: Status: ACTIVE | Noted: 2019-02-05

## 2022-01-21 ENCOUNTER — VIRTUAL VISIT (OUTPATIENT)
Dept: INTERNAL MEDICINE | Facility: CLINIC | Age: 42
End: 2022-01-21
Payer: COMMERCIAL

## 2022-01-21 DIAGNOSIS — Z13.1 SCREENING FOR DIABETES MELLITUS: ICD-10-CM

## 2022-01-21 DIAGNOSIS — Z13.6 CARDIOVASCULAR SCREENING; LDL GOAL LESS THAN 100: ICD-10-CM

## 2022-01-21 DIAGNOSIS — F32.1 MODERATE MAJOR DEPRESSION (H): ICD-10-CM

## 2022-01-21 PROCEDURE — 96127 BRIEF EMOTIONAL/BEHAV ASSMT: CPT | Performed by: INTERNAL MEDICINE

## 2022-01-21 PROCEDURE — 99213 OFFICE O/P EST LOW 20 MIN: CPT | Mod: TEL | Performed by: INTERNAL MEDICINE

## 2022-01-21 RX ORDER — DESVENLAFAXINE 100 MG/1
100 TABLET, EXTENDED RELEASE ORAL DAILY
Qty: 90 TABLET | Refills: 3 | Status: SHIPPED | OUTPATIENT
Start: 2022-01-21 | End: 2022-04-19 | Stop reason: DRUGHIGH

## 2022-01-21 ASSESSMENT — ANXIETY QUESTIONNAIRES
7. FEELING AFRAID AS IF SOMETHING AWFUL MIGHT HAPPEN: NOT AT ALL
2. NOT BEING ABLE TO STOP OR CONTROL WORRYING: SEVERAL DAYS
3. WORRYING TOO MUCH ABOUT DIFFERENT THINGS: SEVERAL DAYS
5. BEING SO RESTLESS THAT IT IS HARD TO SIT STILL: NOT AT ALL
GAD7 TOTAL SCORE: 5
1. FEELING NERVOUS, ANXIOUS, OR ON EDGE: SEVERAL DAYS
6. BECOMING EASILY ANNOYED OR IRRITABLE: SEVERAL DAYS
IF YOU CHECKED OFF ANY PROBLEMS ON THIS QUESTIONNAIRE, HOW DIFFICULT HAVE THESE PROBLEMS MADE IT FOR YOU TO DO YOUR WORK, TAKE CARE OF THINGS AT HOME, OR GET ALONG WITH OTHER PEOPLE: SOMEWHAT DIFFICULT

## 2022-01-21 ASSESSMENT — PATIENT HEALTH QUESTIONNAIRE - PHQ9
SUM OF ALL RESPONSES TO PHQ QUESTIONS 1-9: 6
5. POOR APPETITE OR OVEREATING: SEVERAL DAYS

## 2022-01-21 NOTE — PATIENT INSTRUCTIONS
Continue current Pristiq dose for now.  Medication refilled at [facility] pharmacy  Patient will have her apartment management fax paperwork for request for emotional support animal documentation to clinic next week  Establish care with new mental health therapist  Call  [phone] or use [employer] to schedule a future lab appointment  fasting in  April    For fasting labs, please refrain from eating for 8 hours or more.   Drink 2 glasses of water before your lab appointment. It is fine to take your  oral medications on the morning of the lab test as usual  Schedule a follow up appointment with me in clinic a few days after these future labs are drawn to review results and other medical issues as necessary.  If worsening mental health between now and then, send me note in [facility]

## 2022-01-21 NOTE — PROGRESS NOTES
Shannen is a 41 year old who is being evaluated via a billable telephone visit.      What phone number would you like to be contacted at? [phone]  How would you like to obtain your AVS? [employer]    ASSESSMENT:   1. Moderate major depression (H)  Situational issue going through divorce and custody downey.  We will continue current medication for now.  Patient to establish with new therapist.  Will write letter of support for patient's pet which appear to definitely add benefit to her mental health.  Patient will speak with her apartment management to have forms faxed to us to complete  - desvenlafaxine (PRISTIQ) 100 MG 24 hr tablet; Take 1 tablet (100 mg) by mouth daily  Dispense: 90 tablet; Refill: 3    2. Screening for diabetes mellitus  Previous mild glucose elevation.  Counseled regarding calorie/carbohydrate reduction.  Follow-up screening labs as ordered  - Comprehensive metabolic panel; Future  - Hemoglobin A1c; Future    3. CARDIOVASCULAR SCREENING; LDL GOAL LESS THAN 100  Previous mild lipid elevation.  Needs follow-up screening  - Lipid panel reflex to direct LDL Fasting; Future      PLAN:  Continue current Pristiq dose for now.  Medication refilled at [facility] pharmacy  Patient will have her apartment management fax paperwork for request for emotional support animal documentation to clinic next week  Establish care with new mental health therapist  Call  [phone] or use [employer] to schedule a future lab appointment  fasting in  April    For fasting labs, please refrain from eating for 8 hours or more.   Drink 2 glasses of water before your lab appointment. It is fine to take your  oral medications on the morning of the lab test as usual  Schedule a follow up appointment with me in clinic a few days after these future labs are drawn to review results and other medical issues as necessary.  If worsening mental health between now and then, send me note in [employer]      Phone call contact  time  Call Started at 4:15pm  Call Ended at 4:30pm  Total minutes: 15 min    (Chart documentation was completed, in part, with [employer] voice-recognition software. Even though reviewed, some grammatical, spelling, and word errors may remain.)    Kevin Francis MD  Internal Medicine Department  [facility] Center[facility] Pride is a 41 year old who presents for the following health issues     HPI     Depression Followup    How are you doing with your depression since your last visit? Worsened -Going through divorce    Are you having other symptoms that might be associated with depression? Yes:  Headaches    Have you had a significant life event?  OTHER: Going through a Divorce-Children are not with Patient      Are you feeling anxious or having panic attacks?   Yes:  There is spme anxiety with travelling and speaking to Spouse's     Do you have any concerns with your use of alcohol or other drugs? No    Social History     Tobacco Use     Smoking status: Former Smoker     Types: Cigarettes     Quit date: 6/23/2011     Years since quitting: 10.5     Smokeless tobacco: Never Used   Substance Use Topics     Alcohol use: No     Drug use: No     PHQ 4/27/2020 5/10/2021 1/21/2022   PHQ-9 Total Score 0 4 6   Q9: Thoughts of better off dead/self-harm past 2 weeks Not at all Not at all Not at all     ROXANA-7 SCORE 4/27/2020 1/21/2022   Total Score 0 5       Last PHQ-9 1/21/2022   1.  Little interest or pleasure in doing things 1   2.  Feeling down, depressed, or hopeless 2   3.  Trouble falling or staying asleep, or sleeping too much 2   4.  Feeling tired or having little energy 1   5.  Poor appetite or overeating 0   6.  Feeling bad about yourself 0   7.  Trouble concentrating 0   8.  Moving slowly or restless 0   Q9: Thoughts of better off dead/self-harm past 2 weeks 0   PHQ-9 Total Score 6   Difficulty at work, home, or with people Somewhat difficult     ROXANA-7  1/21/2022   1. Feeling  "nervous, anxious, or on edge 1   2. Not being able to stop or control worrying 1   3. Worrying too much about different things 1   4. Trouble relaxing 1   5. Being so restless that it is hard to sit still 0   6. Becoming easily annoyed or irritable 1   7. Feeling afraid, as if something awful might happen 0   ROXANA-7 Total Score 5   If you checked any problems, how difficult have they made it for you to do your work, take care of things at home, or get along with other people? Somewhat difficult       Suicide Assessment Five-step Evaluation and Treatment (SAFE-T)        Most recent lab results reviewed with pt.      Patient going through a divorce with her wife who is currently living in [place].  Two sons ages six and 10 were currently with patient's wife.  Undergoing custody legal issues.  Patient has moved back to [place].  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  Previously had been working with a therapist who retired.  Is working on getting an appointment with a new therapist.  Has found counseling helpful.  Has good support through work and friends.  Parents and brother live here in the [facility] also.  Because of COVID, patient states that they did not have a custody evaluation scheduled until March 2022.  PHQ and ROXANA scores as above.  On Pristiq currently.  Patient has two cats \"Bennie and Phil\" that help keep her calm and happier when at home.  Patient living in apartment building currently and requests a letter to be done for authorization of the animals to be classified as former to support so that she is not paying $60 per month with her portability.  Patient states her apartment management service would have access forms to be signed.     Component      Latest Ref Rng & Units 5/17/2021   Sodium      133 - 144 mmol/L 137   Potassium      3.4 - 5.3 mmol/L 4.0   Chloride      94 - 109 mmol/L 104   Carbon Dioxide      20 - 32 mmol/L 30   Anion Gap      3 - " 14 mmol/L 3   Glucose      70 - 99 mg/dL 101 (H)   Urea Nitrogen      7 - 30 mg/dL 13   Creatinine      0.52 - 1.04 mg/dL 0.77   GFR Estimate      >60 mL/min/1.73:m2 >90   GFR Estimate If Black      >60 mL/min/1.73:m2 >90   Calcium      8.5 - 10.1 mg/dL 8.5   Bilirubin Total      0.2 - 1.3 mg/dL 0.9   Albumin      3.4 - 5.0 g/dL 3.4   Protein Total      6.8 - 8.8 g/dL 7.2   Alkaline Phosphatase      40 - 150 U/L 100   ALT      0 - 50 U/L 25   AST      0 - 45 U/L 24   Cholesterol      <200 mg/dL 198   Triglycerides      <150 mg/dL 177 (H)   HDL Cholesterol      >49 mg/dL 56   LDL Cholesterol Calculated      <100 mg/dL 107 (H)   Non HDL Cholesterol      <130 mg/dL 142 (H)         Additional ROS:   Constitutional, HEENT, Cardiovascular, Pulmonary, GI and , Neuro, MSK and Psych review of systems/symptoms are otherwise negative or unchanged from previous, except as noted above.           Objective:  Any reported vitals from today were reviewed in chart. See nursing documentation for details    RESP: No cough, no audible wheezing, able to talk in full sentences  GEN: Normal affect. Normal though content/speech  Remainder of exam unable to be completed due to telephone visits

## 2022-01-22 ASSESSMENT — ANXIETY QUESTIONNAIRES: GAD7 TOTAL SCORE: 5

## 2022-01-28 ENCOUNTER — LAB REQUISITION (OUTPATIENT)
Dept: LAB | Facility: CLINIC | Age: 42
End: 2022-01-28

## 2022-01-28 PROCEDURE — U0003 INFECTIOUS AGENT DETECTION BY NUCLEIC ACID (DNA OR RNA); SEVERE ACUTE RESPIRATORY SYNDROME CORONAVIRUS 2 (SARS-COV-2) (CORONAVIRUS DISEASE [COVID-19]), AMPLIFIED PROBE TECHNIQUE, MAKING USE OF HIGH THROUGHPUT TECHNOLOGIES AS DESCRIBED BY CMS-2020-01-R: HCPCS | Performed by: INTERNAL MEDICINE

## 2022-01-29 LAB — SARS-COV-2 RNA RESP QL NAA+PROBE: POSITIVE

## 2022-02-22 ENCOUNTER — OFFICE VISIT (OUTPATIENT)
Dept: URGENT CARE | Facility: URGENT CARE | Age: 42
End: 2022-02-22
Payer: COMMERCIAL

## 2022-02-22 VITALS
RESPIRATION RATE: 17 BRPM | TEMPERATURE: 98.6 F | SYSTOLIC BLOOD PRESSURE: 113 MMHG | OXYGEN SATURATION: 96 % | HEART RATE: 84 BPM | DIASTOLIC BLOOD PRESSURE: 79 MMHG

## 2022-02-22 DIAGNOSIS — J01.90 ACUTE SINUSITIS WITH SYMPTOMS > 10 DAYS: Primary | ICD-10-CM

## 2022-02-22 DIAGNOSIS — R05.9 COUGH: ICD-10-CM

## 2022-02-22 PROCEDURE — 99213 OFFICE O/P EST LOW 20 MIN: CPT | Performed by: FAMILY MEDICINE

## 2022-02-22 RX ORDER — ALBUTEROL SULFATE 90 UG/1
2 AEROSOL, METERED RESPIRATORY (INHALATION) EVERY 6 HOURS
Qty: 18 G | Refills: 0 | Status: SHIPPED | OUTPATIENT
Start: 2022-02-22 | End: 2022-05-27

## 2022-02-22 RX ORDER — DOXYCYCLINE 100 MG/1
100 TABLET ORAL 2 TIMES DAILY
Qty: 14 TABLET | Refills: 0 | Status: SHIPPED | OUTPATIENT
Start: 2022-02-22 | End: 2022-03-01

## 2022-02-22 NOTE — PROGRESS NOTES
SUBJECTIVE: Shannen Evans is a 41 year old female here with concerns about sinus infection.  She states onset  of symptoms were greater than 10 days with sinus pressure and drainage.  Course of illness is worsening.    Severity: moderate  Current and Associated symptoms: cold symptoms  Predisposing factors include none.   Recent treatment has included: OTC's  Allergic symptoms include: none    Past Medical History:   Diagnosis Date     Cholelithiasis August 2010     Gestational diabetes     diet controlled     Hyperlipidemia LDL goal <160 10/31/2010     Moderate major depression (H) 2/5/2019     Morbid obesity (H) 10/11/2017     Vitamin D deficiency 2/26/2014     Allergies   Allergen Reactions     Amoxicillin Hives     As an infant       Penicillin [Penicillins] Hives     Social History     Tobacco Use     Smoking status: Former Smoker     Types: Cigarettes     Quit date: 6/23/2011     Years since quitting: 10.6     Smokeless tobacco: Never Used   Substance Use Topics     Alcohol use: No       ROS:   no rash  no vomiting    OBJECTIVE:  /79   Pulse 84   Temp 98.6  F (37  C) (Tympanic)   Resp 17   SpO2 96%   NAD  EYES: clear, no mattering  EARS: TM's clear, no redness/buldging, canals clear, no swelling/redness  NOSE: discolored discharge winston. Nares  THROAT: clear, no erythema, no exudate  SINUS: maxillary tenderness with palpation  LUNGS: CTAB, no rhonchi/wheeze/rales      ICD-10-CM    1. Acute sinusitis with symptoms > 10 days  J01.90 doxycycline monohydrate (ADOXA) 100 MG tablet   2. Cough  R05.9 albuterol (PROAIR HFA) 108 (90 Base) MCG/ACT inhaler       Follow up with primary clinic if not improving

## 2022-04-19 ENCOUNTER — TELEPHONE (OUTPATIENT)
Dept: INTERNAL MEDICINE | Facility: CLINIC | Age: 42
End: 2022-04-19
Payer: COMMERCIAL

## 2022-04-19 DIAGNOSIS — F32.0 CURRENT MILD EPISODE OF MAJOR DEPRESSIVE DISORDER WITHOUT PRIOR EPISODE (H): Primary | ICD-10-CM

## 2022-04-19 RX ORDER — DESVENLAFAXINE 50 MG/1
50 TABLET, FILM COATED, EXTENDED RELEASE ORAL DAILY
Qty: 90 TABLET | Refills: 0 | Status: SHIPPED | OUTPATIENT
Start: 2022-04-19 | End: 2022-05-27

## 2022-04-19 NOTE — TELEPHONE ENCOUNTER
Received a call from the patient requesting a new script for Pristiq 50 mg tablets. Patient is currently prescribed 100 mg tablets but patient feels like she can back off on the dose.     Pristiq 50 mg tablets pended to the requested pharmacy.   Call back number: [phone]  Ok to leave detailed voicemail    Routing to PCP for review.    Magdalena Julien RN

## 2022-04-19 NOTE — TELEPHONE ENCOUNTER
We will have patient reduce Pristiq to 50 mg tablet, 1 tablet daily for depression management.  90-day supply sent to the [facility] pharmacy.   Patient is scheduled to see me in follow-up on 5/27/2022.  I will review mental health status at that time with dosage reduction.  Patient to contact me earlier if having worsening mental health issues with the lower dosage of Pristiq.  Inform patient and then close encounter

## 2022-04-21 NOTE — TELEPHONE ENCOUNTER
Called patient and relayed message from Dr. Franics. Patient expressed understanding and had no further questions at this time.    Magdalena Julien RN

## 2022-05-27 ENCOUNTER — OFFICE VISIT (OUTPATIENT)
Dept: INTERNAL MEDICINE | Facility: CLINIC | Age: 42
End: 2022-05-27

## 2022-05-27 ENCOUNTER — LAB (OUTPATIENT)
Dept: LAB | Facility: CLINIC | Age: 42
End: 2022-05-27
Payer: COMMERCIAL

## 2022-05-27 VITALS
TEMPERATURE: 97.5 F | WEIGHT: 269 LBS | DIASTOLIC BLOOD PRESSURE: 72 MMHG | BODY MASS INDEX: 40.77 KG/M2 | HEART RATE: 85 BPM | HEIGHT: 68 IN | RESPIRATION RATE: 16 BRPM | OXYGEN SATURATION: 99 % | SYSTOLIC BLOOD PRESSURE: 118 MMHG

## 2022-05-27 DIAGNOSIS — F32.0 CURRENT MILD EPISODE OF MAJOR DEPRESSIVE DISORDER WITHOUT PRIOR EPISODE (H): ICD-10-CM

## 2022-05-27 DIAGNOSIS — R73.9 BLOOD GLUCOSE ELEVATED: ICD-10-CM

## 2022-05-27 DIAGNOSIS — Z00.01 ENCOUNTER FOR ROUTINE ADULT MEDICAL EXAM WITH ABNORMAL FINDINGS: Primary | ICD-10-CM

## 2022-05-27 DIAGNOSIS — E66.01 MORBID OBESITY (H): ICD-10-CM

## 2022-05-27 DIAGNOSIS — L98.9 SKIN LESION: ICD-10-CM

## 2022-05-27 DIAGNOSIS — Z11.59 NEED FOR HEPATITIS C SCREENING TEST: ICD-10-CM

## 2022-05-27 DIAGNOSIS — Z13.6 CARDIOVASCULAR SCREENING; LDL GOAL LESS THAN 100: ICD-10-CM

## 2022-05-27 DIAGNOSIS — Z13.1 SCREENING FOR DIABETES MELLITUS: ICD-10-CM

## 2022-05-27 DIAGNOSIS — E78.5 HYPERLIPIDEMIA LDL GOAL <100: ICD-10-CM

## 2022-05-27 LAB
ALBUMIN SERPL-MCNC: 3.6 G/DL (ref 3.4–5)
ALP SERPL-CCNC: 89 U/L (ref 40–150)
ALT SERPL W P-5'-P-CCNC: 40 U/L (ref 0–50)
ANION GAP SERPL CALCULATED.3IONS-SCNC: 3 MMOL/L (ref 3–14)
AST SERPL W P-5'-P-CCNC: 23 U/L (ref 0–45)
BILIRUB SERPL-MCNC: 1 MG/DL (ref 0.2–1.3)
BUN SERPL-MCNC: 13 MG/DL (ref 7–30)
CALCIUM SERPL-MCNC: 9 MG/DL (ref 8.5–10.1)
CHLORIDE BLD-SCNC: 109 MMOL/L (ref 94–109)
CHOLEST SERPL-MCNC: 185 MG/DL
CO2 SERPL-SCNC: 29 MMOL/L (ref 20–32)
CREAT SERPL-MCNC: 0.65 MG/DL (ref 0.52–1.04)
FASTING STATUS PATIENT QL REPORTED: YES
GFR SERPL CREATININE-BSD FRML MDRD: >90 ML/MIN/1.73M2
GLUCOSE BLD-MCNC: 106 MG/DL (ref 70–99)
HBA1C MFR BLD: 5.5 % (ref 0–5.6)
HDLC SERPL-MCNC: 55 MG/DL
LDLC SERPL CALC-MCNC: 103 MG/DL
NONHDLC SERPL-MCNC: 130 MG/DL
POTASSIUM BLD-SCNC: 4.2 MMOL/L (ref 3.4–5.3)
PROT SERPL-MCNC: 7.3 G/DL (ref 6.8–8.8)
SODIUM SERPL-SCNC: 141 MMOL/L (ref 133–144)
TRIGL SERPL-MCNC: 135 MG/DL

## 2022-05-27 PROCEDURE — 80053 COMPREHEN METABOLIC PANEL: CPT

## 2022-05-27 PROCEDURE — 80061 LIPID PANEL: CPT

## 2022-05-27 PROCEDURE — 83036 HEMOGLOBIN GLYCOSYLATED A1C: CPT

## 2022-05-27 PROCEDURE — 36415 COLL VENOUS BLD VENIPUNCTURE: CPT

## 2022-05-27 PROCEDURE — 99214 OFFICE O/P EST MOD 30 MIN: CPT | Mod: 25 | Performed by: INTERNAL MEDICINE

## 2022-05-27 PROCEDURE — 99396 PREV VISIT EST AGE 40-64: CPT | Performed by: INTERNAL MEDICINE

## 2022-05-27 RX ORDER — DESVENLAFAXINE 50 MG/1
50 TABLET, FILM COATED, EXTENDED RELEASE ORAL DAILY
Qty: 90 TABLET | Refills: 3 | Status: SHIPPED | OUTPATIENT
Start: 2022-05-27 | End: 2023-08-31

## 2022-05-27 ASSESSMENT — ANXIETY QUESTIONNAIRES
6. BECOMING EASILY ANNOYED OR IRRITABLE: NOT AT ALL
IF YOU CHECKED OFF ANY PROBLEMS ON THIS QUESTIONNAIRE, HOW DIFFICULT HAVE THESE PROBLEMS MADE IT FOR YOU TO DO YOUR WORK, TAKE CARE OF THINGS AT HOME, OR GET ALONG WITH OTHER PEOPLE: NOT DIFFICULT AT ALL
5. BEING SO RESTLESS THAT IT IS HARD TO SIT STILL: NOT AT ALL
3. WORRYING TOO MUCH ABOUT DIFFERENT THINGS: SEVERAL DAYS
7. FEELING AFRAID AS IF SOMETHING AWFUL MIGHT HAPPEN: NOT AT ALL
2. NOT BEING ABLE TO STOP OR CONTROL WORRYING: MORE THAN HALF THE DAYS
GAD7 TOTAL SCORE: 4
GAD7 TOTAL SCORE: 4
1. FEELING NERVOUS, ANXIOUS, OR ON EDGE: SEVERAL DAYS

## 2022-05-27 ASSESSMENT — ENCOUNTER SYMPTOMS
HEMATURIA: 0
EYE PAIN: 0
HEARTBURN: 0
SORE THROAT: 0
HEMATOCHEZIA: 0
FEVER: 0
CHILLS: 0
ABDOMINAL PAIN: 0
JOINT SWELLING: 0
PALPITATIONS: 0
DIZZINESS: 0
DIARRHEA: 0
NAUSEA: 0
SHORTNESS OF BREATH: 0
PARESTHESIAS: 0
DYSURIA: 0
ARTHRALGIAS: 0
NERVOUS/ANXIOUS: 0
COUGH: 0
HEADACHES: 0
WEAKNESS: 0
MYALGIAS: 0
CONSTIPATION: 0
FREQUENCY: 0

## 2022-05-27 ASSESSMENT — PATIENT HEALTH QUESTIONNAIRE - PHQ9
5. POOR APPETITE OR OVEREATING: NOT AT ALL
SUM OF ALL RESPONSES TO PHQ QUESTIONS 1-9: 1
10. IF YOU CHECKED OFF ANY PROBLEMS, HOW DIFFICULT HAVE THESE PROBLEMS MADE IT FOR YOU TO DO YOUR WORK, TAKE CARE OF THINGS AT HOME, OR GET ALONG WITH OTHER PEOPLE: NOT DIFFICULT AT ALL
SUM OF ALL RESPONSES TO PHQ QUESTIONS 1-9: 1

## 2022-05-27 NOTE — PROGRESS NOTES
SUBJECTIVE:   CC: Shannen Evans is an 42 year old woman who presents for preventive health visit, follow-up depression and review of  labs from earlier today drawn for history mild glucose of lipid elevations      Patient has been advised of split billing requirements and indicates understanding: Yes  Healthy Habits:     Getting at least 3 servings of Calcium per day:  Yes    Bi-annual eye exam:  NO    Dental care twice a year:  Yes    Sleep apnea or symptoms of sleep apnea:  None    Diet:  Regular (no restrictions)    Frequency of exercise:  1 day/week    Duration of exercise:  Less than 15 minutes    Taking medications regularly:  Yes    Medication side effects:  None    PHQ-2 Total Score: 0    Additional concerns today:  No  Answers for HPI/ROS submitted by the patient on 5/27/2022  If you checked off any problems, how difficult have these problems made it for you to do your work, take care of things at home, or get along with other people?: Not difficult at all  PHQ9 TOTAL SCORE: 1        Today's PHQ-2 Score:   PHQ-2 ( 1999 [employer]) 5/27/2022   Q1: Little interest or pleasure in doing things 0   Q2: Feeling down, depressed or hopeless 0   PHQ-2 Score 0   PHQ-2 Total Score (12-17 Years)- Positive if 3 or more points; Administer PHQ-A if positive -   Q1: Little interest or pleasure in doing things Not at all   Q2: Feeling down, depressed or hopeless Not at all   PHQ-2 Score 0       Abuse: Current or Past (Physical, Sexual or Emotional) - No  Do you feel safe in your environment? Yes      Social History     Tobacco Use     Smoking status: Former Smoker     Types: Cigarettes     Quit date: 6/23/2011     Years since quitting: 10.9     Smokeless tobacco: Never Used   Substance Use Topics     Alcohol use: No     If you drink alcohol do you typically have >3 drinks per day or >7 drinks per week? No    Alcohol Use 5/27/2022   Prescreen: >3 drinks/day or >7 drinks/week? No   Prescreen: >3 drinks/day or >7  drinks/week? -       Reviewed orders with patient.  Reviewed health maintenance and updated orders accordingly - Yes  Labs reviewed in EPIC    Breast Cancer Screening:    FHS-7:   Breast CA Risk Assessment (FHS-7) 5/27/2022 5/27/2022   Did any of your first-degree relatives have breast or ovarian cancer? No No   Did any of your relatives have bilateral breast cancer? No No   Did any man in your family have breast cancer? No -   Did any woman in your family have breast and ovarian cancer? Yes -   Did any woman in your family have breast cancer before age 50 y? No -   Do you have 2 or more relatives with breast and/or ovarian cancer? No -   Do you have 2 or more relatives with breast and/or bowel cancer? No -       Mammogram Screening - Offered annual screening and updated Health Maintenance for mutual plan based on risk factor consideration    Pertinent mammograms are reviewed under the imaging tab.    History of abnormal Pap smear: Status post benign hysterectomy. Health Maintenance and Surgical History updated.     Reviewed and updated as needed this visit by clinical staff                    Reviewed and updated as needed this visit by Provider                      Component      Latest Ref Rng & Units 5/17/2021 5/27/2022   Sodium      133 - 144 mmol/L 137 141   Potassium      3.4 - 5.3 mmol/L 4.0 4.2   Chloride      94 - 109 mmol/L 104 109   Carbon Dioxide      20 - 32 mmol/L 30 29   Anion Gap      3 - 14 mmol/L 3 3   Glucose      70 - 99 mg/dL 101 (H) 106 (H)   Urea Nitrogen      7 - 30 mg/dL 13 13   Creatinine      0.52 - 1.04 mg/dL 0.77 0.65   GFR Estimate      >60 mL/min/1.73m2 >90 >90   GFR Estimate If Black      >60 mL/min/1.73:m2 >90    Calcium      8.5 - 10.1 mg/dL 8.5 9.0   Bilirubin Total      0.2 - 1.3 mg/dL 0.9 1.0   Albumin      3.4 - 5.0 g/dL 3.4 3.6   Protein Total      6.8 - 8.8 g/dL 7.2 7.3   Alkaline Phosphatase      40 - 150 U/L 100    ALT      0 - 50 U/L 25 40   AST      0 - 45 U/L 24 23    Alkaline Phosphatase      40 - 150 U/L  89   Cholesterol      <200 mg/dL 198 185   Triglycerides      <150 mg/dL 177 (H) 135   HDL Cholesterol      >=50 mg/dL 56 55   LDL Cholesterol Calculated      <=100 mg/dL 107 (H) 103 (H)   Non HDL Cholesterol      <130 mg/dL 142 (H) 130 (H)   Patient Fasting > 8hrs?        Yes   TSH      0.40 - 4.00 mU/L 0.89    Hemoglobin A1C      0.0 - 5.6 %  5.5       No identified additional risks  The 10-year ASCVD risk score (Aimee FELICIANO JrJeanine, et al., 2013) is: 0.4%    Values used to calculate the score:      Age: 42 years      Sex: Female      Is Non- : No      Diabetic: No      Tobacco smoker: No      Systolic Blood Pressure: 118 mmHg      Is BP treated: No      HDL Cholesterol: 55 mg/dL      Total Cholesterol: 185 mg/dL      Review of Systems   Constitutional: Negative for chills and fever.   HENT: Negative for congestion, ear pain, hearing loss and sore throat.    Eyes: Negative for pain and visual disturbance.   Respiratory: Negative for cough and shortness of breath.    Cardiovascular: Negative for chest pain, palpitations and peripheral edema.   Gastrointestinal: Negative for abdominal pain, constipation, diarrhea, heartburn, hematochezia and nausea.   Genitourinary: Negative for dysuria, frequency, genital sores, hematuria and urgency.   Musculoskeletal: Negative for arthralgias, joint swelling and myalgias.   Skin: Negative for rash.   Neurological: Negative for dizziness, weakness, headaches and paresthesias.   Psychiatric/Behavioral: Negative for mood changes. The patient is not nervous/anxious.        PHQ = 1.  Mood doing well.  Patient has a new girlfriend Maritza and potentially becoming engaged soon.  Had a hysterectomy with cervix removed in October 2020 so no longer requires Pap smears  Weight down 8 pounds  Has a skin growth on her scalp that she will be seeing dermatology for with potential removal.      OBJECTIVE:   /72   Pulse 85   Temp  "97.5  F (36.4  C) (Temporal)   Resp 16   Ht 1.727 m (5' 8\")   Wt 122 kg (269 lb)   SpO2 99%   BMI 40.90 kg/m    Physical Exam  General appearance -   alert, no distress.  Normal appearing affect.  Smiling and appears happy  Skin - No rashes.  Approximate 8 mm papillary cauliflower-like skin growth right posterior scalp  Head - normocephalic, atraumatic  Eyes - SELINA, EOMI, fundi exam with nondilated pupils negative.  Ears - External ears normal. Canals clear. TM's normal.  Nose/Sinuses - Nares normal. Septum midline. Mucosa normal. No drainage or sinus tenderness.  Oropharynx - No erythema, no adenopathy, no exudates.  Neck - Supple without adenopathy or thyromegaly. No bruits.  Lungs - Clear to auscultation without wheezes/rhonchi.  Heart - Regular rate and rhythm without murmurs, clicks, or gallops.  Nodes - No supraclavicular, axillary, or inguinal adenopathy palpable.  Breasts - deferred  Abdomen - Abdomen obese, soft, non-tender. BS normal. No masses or hepatosplenomegaly palpable. No bruits.  Extremities -No cyanosis, clubbing or edema.    Musculoskeletal - Spine ROM normal. Muscular strength intact.   Peripheral pulses - radial=4/4, femoral=4/4, posterior tibial=4/4, dorsalis pedis=4/4,  Neuro - Gait normal. Reflexes normal and symmetric. Sensation grossly WNL.  Genital/Rectal - deferred       ASSESSMENT/PLAN:   1. Encounter for routine adult medical exam with abnormal findings  Had mammogram earlier today.  Vaccinations up-to-date for age.  Pap no longer indicated with hysterectomy.  Diet and exercise counseling as below  - Comprehensive metabolic panel; Future    2. Current mild episode of major depressive disorder without prior episode (H)   Controlled.PHQ9 = 1.  Continue current medication.  Follow-up in 1 year or earlier as needed  - desvenlafaxine (PRISTIQ) 50 MG 24 hr tablet; Take 1 tablet (50 mg) by mouth daily  Dispense: 90 tablet; Refill: 3    3. Morbid obesity (H)  Weight down 8 pounds.  " "Contributing to hyperlipidemia and elevated glucose.  Patient states her energy is doing well and less snoring recently    4. Blood glucose elevated  Mild glucose elevation with normal A1c.  Counseled regarding carbohydrate reduction and increase in exercise.  Recheck lab 1 year  - Comprehensive metabolic panel; Future  - Hemoglobin A1c; Future    5. Hyperlipidemia LDL goal <100  Minimal LDL elevation.  Not requiring statin therapy.  Will work on exercise and diet and recheck 1 year  - Lipid panel reflex to direct LDL Fasting; Future    6. Skin lesion  Appears benign but causing some discomfort when combing hair, etc.  Patient has dermatology appointment scheduled for possible excision    7. 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    Weight as of 5/10/21: 123.8 kg (273 lb).    Weight management plan: Discussed healthy diet and exercise guidelines    She reports that she quit smoking about 10 years ago. Her smoking use included cigarettes. She has never used smokeless tobacco.      Counseling Resources:  ATP IV Guidelines  Pooled Cohorts Equation Calculator  Breast Cancer Risk Calculator  BRCA-Related Cancer Risk Assessment: FHS-7 Tool  FRAX Risk Assessment  ICSI Preventive Guidelines  Dietary Guidelines for Americans, 2010  USDA's MyPlate  ASA Prophylaxis  Lung CA Screening    PLAN:  Continue current meds  Prescriptions refilled.    Call  [phone] or use [employer] to schedule a future lab appointment  fasting in 1 year.   For fasting labs, please refrain from eating for 8 hours or more.   Drink 2 glasses of water before your lab appointment. It is " fine to take your  oral medications on the morning of the lab test as usual  Schedule a follow up appointment with me in clinic a few days after these future labs are drawn to review results and other medical issues as necessary  Reduce calorie/carbohydrate (sugar, bread, potato, pasta, rice, alcohol etc)  intake in diet.  Increase color on your plate with fruits and vegetables. Increase  frequency of walking or other aerobic exercise as able (goal is daily)  Schedule an eye exam appointment in the next 1 year  Follow-up  with Dermatology as scheduled re: scalp skin lesion  Pt was informed regarding extra E&M billing for management of new or established medical issues not related to today's wellness visit    Kevin Francis MD  [facility]

## 2022-05-29 PROBLEM — R73.9 BLOOD GLUCOSE ELEVATED: Status: ACTIVE | Noted: 2022-05-29

## 2022-05-29 PROBLEM — E78.5 HYPERLIPIDEMIA LDL GOAL <100: Status: ACTIVE | Noted: 2022-05-29

## 2022-05-29 NOTE — PATIENT INSTRUCTIONS
Continue current meds  Prescriptions refilled.    Call  [phone] or use [employer] to schedule a future lab appointment  fasting in 1 year.   For fasting labs, please refrain from eating for 8 hours or more.   Drink 2 glasses of water before your lab appointment. It is fine to take your  oral medications on the morning of the lab test as usual  Schedule a follow up appointment with me in clinic a few days after these future labs are drawn to review results and other medical issues as necessary  Reduce calorie/carbohydrate (sugar, bread, potato, pasta, rice, alcohol etc)  intake in diet.  Increase color on your plate with fruits and vegetables. Increase  frequency of walking or other aerobic exercise as able (goal is daily)  Schedule an eye exam appointment in the next 1 year  Follow-up  with Dermatology as scheduled re: scalp skin lesion  Pt was informed regarding extra E&M billing for management of new or established medical issues not related to today's wellness visit

## 2022-11-21 ENCOUNTER — HEALTH MAINTENANCE LETTER (OUTPATIENT)
Age: 42
End: 2022-11-21

## 2023-01-14 ENCOUNTER — OFFICE VISIT (OUTPATIENT)
Dept: URGENT CARE | Facility: URGENT CARE | Age: 43
End: 2023-01-14
Payer: COMMERCIAL

## 2023-01-14 ENCOUNTER — ANCILLARY PROCEDURE (OUTPATIENT)
Dept: GENERAL RADIOLOGY | Facility: CLINIC | Age: 43
End: 2023-01-14
Attending: PHYSICIAN ASSISTANT
Payer: COMMERCIAL

## 2023-01-14 VITALS
HEART RATE: 64 BPM | TEMPERATURE: 98.1 F | WEIGHT: 260 LBS | RESPIRATION RATE: 16 BRPM | OXYGEN SATURATION: 99 % | DIASTOLIC BLOOD PRESSURE: 88 MMHG | BODY MASS INDEX: 39.53 KG/M2 | SYSTOLIC BLOOD PRESSURE: 129 MMHG

## 2023-01-14 DIAGNOSIS — J40 BRONCHITIS: ICD-10-CM

## 2023-01-14 DIAGNOSIS — Z76.0 ENCOUNTER FOR MEDICATION REFILL: ICD-10-CM

## 2023-01-14 DIAGNOSIS — R05.1 ACUTE COUGH: Primary | ICD-10-CM

## 2023-01-14 PROCEDURE — 99213 OFFICE O/P EST LOW 20 MIN: CPT | Performed by: PHYSICIAN ASSISTANT

## 2023-01-14 PROCEDURE — 71046 X-RAY EXAM CHEST 2 VIEWS: CPT | Mod: TC | Performed by: RADIOLOGY

## 2023-01-14 RX ORDER — ALBUTEROL SULFATE 90 UG/1
2 AEROSOL, METERED RESPIRATORY (INHALATION) EVERY 6 HOURS PRN
Qty: 18 G | Refills: 0 | Status: SHIPPED | OUTPATIENT
Start: 2023-01-14 | End: 2023-11-06

## 2023-01-14 RX ORDER — BENZONATATE 200 MG/1
200 CAPSULE ORAL 3 TIMES DAILY PRN
Qty: 25 CAPSULE | Refills: 0 | Status: SHIPPED | OUTPATIENT
Start: 2023-01-14 | End: 2023-11-06

## 2023-01-14 ASSESSMENT — ENCOUNTER SYMPTOMS
DIARRHEA: 0
NAUSEA: 0
SORE THROAT: 0
CHOKING: 0
EYES NEGATIVE: 1
APPETITE CHANGE: 0
FATIGUE: 0
TROUBLE SWALLOWING: 0
NEUROLOGICAL NEGATIVE: 1
VOMITING: 0
ENDOCRINE NEGATIVE: 1
FEVER: 0
CHILLS: 0
HEMATOLOGIC/LYMPHATIC NEGATIVE: 1
PSYCHIATRIC NEGATIVE: 1
CARDIOVASCULAR NEGATIVE: 1
SHORTNESS OF BREATH: 0
COUGH: 1
RHINORRHEA: 0
CONSTIPATION: 0
MUSCULOSKELETAL NEGATIVE: 1
WOUND: 0
ACTIVITY CHANGE: 0
COLOR CHANGE: 0
DIAPHORESIS: 0

## 2023-01-14 NOTE — PROGRESS NOTES
SUBJECTIVE:   Shannen Evans is a 42 year old female presenting with a chief complaint of productive cough for 5 days. Patient states cough began shortly after 2.5 hour flight. Patient denies chest pain or shortness of breath. Patient also denies any other URI symptoms. Patient states she also started getting a headache which she attributes to persistent cough. She tried Dayquil/Nyquil, Humidified air, mucinex without relief. Inhaler helped for a few hours but she found herself having to use it q 6 hr. Patient is a former smoker. She denies hemoptysis or recent weight loss. Patient states she had Covid-19 one month ago and tested negative for Covid-19 2 days ago. Patient confirmed improvement in symptoms after Covid-19 diagnosis and states she noticed new cough symptoms as of 5 days ago.   Chief Complaint   Patient presents with     Urgent Care     Cough     Student ok. Bad cough x6 days, productive mucus, inhaler use every 6 hours, c/o bronchitis, Covid x1 month ago. Covid neg.      Sinus Problem       She is an established patient of [facility].    Review of Systems   Constitutional: Negative for activity change, appetite change, chills, diaphoresis, fatigue and fever.   HENT: Negative for congestion, ear pain, rhinorrhea, sore throat and trouble swallowing.    Eyes: Negative.    Respiratory: Positive for cough. Negative for choking and shortness of breath.         Negative for hemoptysis   Cardiovascular: Negative.  Negative for chest pain.   Gastrointestinal: Negative for constipation, diarrhea, nausea and vomiting.   Endocrine: Negative.    Genitourinary: Negative.    Musculoskeletal: Negative.    Skin: Negative for color change, pallor, rash and wound.   Allergic/Immunologic: Positive for environmental allergies.        Seasonal allergies  Allergies to PCN and Amoxicillin    Neurological: Negative.    Hematological: Negative.    Psychiatric/Behavioral: Negative.        Past Medical History:   Diagnosis Date      Cholelithiasis 2010     Gestational diabetes     diet controlled     Hyperlipidemia LDL goal <160 10/31/2010     Moderate major depression (H) 2019     Morbid obesity (H) 10/11/2017     Vitamin D deficiency 2014     Family History   Problem Relation Age of Onset     Family History Negative Mother      Family History Negative Father      Hypertension Brother      Breast Cancer Paternal Grandmother      Arthritis Paternal Grandmother         Rheumatoid arthritis     Cancer Paternal Grandfather         Bladder CA     Cancer Maternal Grandfather         Lung cancer     Diabetes Maternal Grandfather      Current Outpatient Medications   Medication Sig Dispense Refill     Cholecalciferol (VITAMIN D) 2000 UNITS tablet Take 2,000 Units by mouth daily 100 tablet 3     desvenlafaxine (PRISTIQ) 50 MG 24 hr tablet Take 1 tablet (50 mg) by mouth daily 90 tablet 3     Loratadine (CLARITIN PO)        Social History     Tobacco Use     Smoking status: Former     Types: Cigarettes     Quit date: 2011     Years since quittin.5     Smokeless tobacco: Never   Substance Use Topics     Alcohol use: No       OBJECTIVE  /88   Pulse 64   Temp 98.1  F (36.7  C) (Oral)   Resp 16   Wt 117.9 kg (260 lb)   SpO2 99%   BMI 39.53 kg/m      Physical Exam  Constitutional:       General: She is not in acute distress.     Appearance: Normal appearance. She is normal weight. She is not ill-appearing.   HENT:      Head: Normocephalic and atraumatic.      Right Ear: Tympanic membrane normal.      Left Ear: Tympanic membrane normal.      Nose: Nose normal. No congestion or rhinorrhea.      Mouth/Throat:      Mouth: Mucous membranes are moist.      Pharynx: Posterior oropharyngeal erythema present. No oropharyngeal exudate.   Eyes:      Extraocular Movements: Extraocular movements intact.      Conjunctiva/sclera: Conjunctivae normal.      Pupils: Pupils are equal, round, and reactive to light.   Cardiovascular:       Rate and Rhythm: Normal rate and regular rhythm.      Pulses: Normal pulses.      Heart sounds: Normal heart sounds.   Pulmonary:      Effort: Pulmonary effort is normal.      Breath sounds: Normal breath sounds. No wheezing, rhonchi or rales.   Chest:      Chest wall: No tenderness.   Musculoskeletal:         General: Normal range of motion.      Cervical back: Normal range of motion.   Skin:     General: Skin is warm and dry.      Coloration: Skin is not pale.      Findings: No bruising, erythema, lesion or rash.   Neurological:      General: No focal deficit present.      Mental Status: She is alert and oriented to person, place, and time. Mental status is at baseline.   Psychiatric:         Mood and Affect: Mood normal.         Behavior: Behavior normal.         Thought Content: Thought content normal.         Judgment: Judgment normal.         Labs:  No results found for this or any previous visit (from the past 24 hour(s)).    X-Ray was done.    ASSESSMENT:      ICD-10-CM    1. Acute cough  R05.1 XR Chest 2 Views           Medical Decision Making:    Differential Diagnosis:  1. Acute Bronchitis    Inclusive: patient had bronchitis before and states this feels similar, only symptom is productive cough, normal XR    2. PNA   Inclusive: productive cough   Exclusive: no fevers/chills, no SOB, normal physical exam, normal XR  3. GERD    Inclusive: cough worse in AM, patient recently had heavy fast food meals   Exclusive: No bitter taste in mouth or cp  4. PE   Exclusive: no cp, no sob, recent flight was 2.5 hrs  5. Lung tumor   Exclusive: no cp, no sob, no hemoptysis, normal XR      PLAN:    Provider discussed diagnosis of Acute bronchitis with patient and prescribed her Tessalon pearls. Albuterol medication was also refilled as patient has been using it to help with coughs and she has found relief. Patient was educated about increasing fluid intake, trying humidified air, and salt-water gargles. Patient was  advised to seek urgent medical attention if red flag signs and symptoms appeared. Patient showed understanding of this information.    Followup:    If not improving or if condition worsens, follow up with your Primary Care Provider

## 2023-04-27 ENCOUNTER — PATIENT OUTREACH (OUTPATIENT)
Dept: CARE COORDINATION | Facility: CLINIC | Age: 43
End: 2023-04-27
Payer: COMMERCIAL

## 2023-05-11 ENCOUNTER — PATIENT OUTREACH (OUTPATIENT)
Dept: CARE COORDINATION | Facility: CLINIC | Age: 43
End: 2023-05-11
Payer: COMMERCIAL

## 2023-07-09 ENCOUNTER — HEALTH MAINTENANCE LETTER (OUTPATIENT)
Age: 43
End: 2023-07-09

## 2023-08-31 DIAGNOSIS — F32.0 CURRENT MILD EPISODE OF MAJOR DEPRESSIVE DISORDER WITHOUT PRIOR EPISODE (H): ICD-10-CM

## 2023-08-31 RX ORDER — DESVENLAFAXINE 50 MG/1
50 TABLET, FILM COATED, EXTENDED RELEASE ORAL DAILY
Qty: 90 TABLET | Refills: 0 | Status: SHIPPED | OUTPATIENT
Start: 2023-08-31 | End: 2023-11-06

## 2023-08-31 NOTE — TELEPHONE ENCOUNTER
FYI - Status Update    Who is Calling: patient    Update: Patient called to say she is OUT of meds    Does caller want a call/response back: Yes     Could we send this information to you in [facility] or would you prefer to receive a phone call?:   Patient would prefer a phone call   Okay to leave a detailed message?: Yes at Cell number on file:    Telephone Information:   Mobile [phone]

## 2023-11-06 ENCOUNTER — OFFICE VISIT (OUTPATIENT)
Dept: INTERNAL MEDICINE | Facility: CLINIC | Age: 43
End: 2023-11-06
Payer: COMMERCIAL

## 2023-11-06 VITALS
OXYGEN SATURATION: 96 % | DIASTOLIC BLOOD PRESSURE: 81 MMHG | SYSTOLIC BLOOD PRESSURE: 116 MMHG | TEMPERATURE: 97.8 F | HEART RATE: 68 BPM | WEIGHT: 287.6 LBS | HEIGHT: 67 IN | BODY MASS INDEX: 45.14 KG/M2

## 2023-11-06 DIAGNOSIS — R73.9 BLOOD GLUCOSE ELEVATED: ICD-10-CM

## 2023-11-06 DIAGNOSIS — F32.0 CURRENT MILD EPISODE OF MAJOR DEPRESSIVE DISORDER WITHOUT PRIOR EPISODE (H): ICD-10-CM

## 2023-11-06 DIAGNOSIS — E66.01 CLASS 3 SEVERE OBESITY WITH BODY MASS INDEX (BMI) OF 45.0 TO 49.9 IN ADULT, UNSPECIFIED OBESITY TYPE, UNSPECIFIED WHETHER SERIOUS COMORBIDITY PRESENT (H): ICD-10-CM

## 2023-11-06 DIAGNOSIS — Z00.01 ENCOUNTER FOR ROUTINE ADULT MEDICAL EXAM WITH ABNORMAL FINDINGS: ICD-10-CM

## 2023-11-06 DIAGNOSIS — Z23 NEED FOR COVID-19 VACCINE: ICD-10-CM

## 2023-11-06 DIAGNOSIS — E66.813 CLASS 3 SEVERE OBESITY WITH BODY MASS INDEX (BMI) OF 45.0 TO 49.9 IN ADULT, UNSPECIFIED OBESITY TYPE, UNSPECIFIED WHETHER SERIOUS COMORBIDITY PRESENT (H): ICD-10-CM

## 2023-11-06 DIAGNOSIS — Z12.31 ENCOUNTER FOR SCREENING MAMMOGRAM FOR BREAST CANCER: ICD-10-CM

## 2023-11-06 PROCEDURE — 91320 SARSCV2 VAC 30MCG TRS-SUC IM: CPT | Performed by: INTERNAL MEDICINE

## 2023-11-06 PROCEDURE — 99213 OFFICE O/P EST LOW 20 MIN: CPT | Mod: 25 | Performed by: INTERNAL MEDICINE

## 2023-11-06 PROCEDURE — 99396 PREV VISIT EST AGE 40-64: CPT | Performed by: INTERNAL MEDICINE

## 2023-11-06 PROCEDURE — 90480 ADMN SARSCOV2 VAC 1/ONLY CMP: CPT | Performed by: INTERNAL MEDICINE

## 2023-11-06 RX ORDER — DESVENLAFAXINE 50 MG/1
50 TABLET, FILM COATED, EXTENDED RELEASE ORAL DAILY
Qty: 90 TABLET | Refills: 3 | Status: SHIPPED | OUTPATIENT
Start: 2023-11-06

## 2023-11-06 ASSESSMENT — ENCOUNTER SYMPTOMS
EYE PAIN: 0
HEMATOCHEZIA: 0
ARTHRALGIAS: 0
CHILLS: 0
COUGH: 0
NERVOUS/ANXIOUS: 0
CONSTIPATION: 0
WEAKNESS: 0
JOINT SWELLING: 0
HEARTBURN: 0
MYALGIAS: 0
HEMATURIA: 0
SORE THROAT: 0
NAUSEA: 0
FREQUENCY: 0
PARESTHESIAS: 0
SHORTNESS OF BREATH: 0
PALPITATIONS: 0
DYSURIA: 0
DIZZINESS: 0
BREAST MASS: 0
FEVER: 0
DIARRHEA: 0
HEADACHES: 0
ABDOMINAL PAIN: 0

## 2023-11-06 ASSESSMENT — PATIENT HEALTH QUESTIONNAIRE - PHQ9
10. IF YOU CHECKED OFF ANY PROBLEMS, HOW DIFFICULT HAVE THESE PROBLEMS MADE IT FOR YOU TO DO YOUR WORK, TAKE CARE OF THINGS AT HOME, OR GET ALONG WITH OTHER PEOPLE: NOT DIFFICULT AT ALL
SUM OF ALL RESPONSES TO PHQ QUESTIONS 1-9: 4
SUM OF ALL RESPONSES TO PHQ QUESTIONS 1-9: 4

## 2023-11-06 NOTE — PROGRESS NOTES
SUBJECTIVE:   CC: Shannen is an 43 year old who presents for preventive health visit and follow-up depression       Healthy Habits:     Getting at least 3 servings of Calcium per day:  Yes    Bi-annual eye exam:  Yes    Dental care twice a year:  Yes    Sleep apnea or symptoms of sleep apnea:  None    Diet:  Regular (no restrictions)    Frequency of exercise:  1 day/week    Duration of exercise:  Less than 15 minutes    Taking medications regularly:  Yes    Medication side effects:  Not applicable    Additional concerns today:  No      Today's PHQ-9 Score:       2023    12:46 PM   PHQ-9 SCORE   PHQ-9 Total Score MyChart 4 (Minimal depression)   PHQ-9 Total Score 4                   Have you ever done Advance Care Planning? (For example, a Health Directive, POLST, or a discussion with a medical provider or your loved ones about your wishes): No, advance care planning information given to patient to review.  Patient declined advance care planning discussion at this time.    Social History     Tobacco Use    Smoking status: Former     Types: Cigarettes     Quit date: 2011     Years since quittin.3    Smokeless tobacco: Never   Substance Use Topics    Alcohol use: No            2023    12:48 PM   Alcohol Use   Prescreen: >3 drinks/day or >7 drinks/week? No     Reviewed orders with patient.  Reviewed health maintenance and updated orders accordingly - Yes  Labs reviewed in EPIC      Component      Latest Ref Rng 2022  8:50 AM   Sodium      133 - 144 mmol/L 141    Potassium      3.4 - 5.3 mmol/L 4.2    Chloride      94 - 109 mmol/L 109    Carbon Dioxide      20 - 32 mmol/L 29    Anion Gap      3 - 14 mmol/L 3    Urea Nitrogen      7 - 30 mg/dL 13    Creatinine      0.52 - 1.04 mg/dL 0.65    Calcium      8.5 - 10.1 mg/dL 9.0    Glucose      70 - 99 mg/dL 106 (H)    Alkaline Phosphatase      40 - 150 U/L 89    AST      0 - 45 U/L 23    ALT      0 - 50 U/L 40    Protein Total      6.8 - 8.8 g/dL  7.3    Albumin      3.4 - 5.0 g/dL 3.6    Bilirubin Total      0.2 - 1.3 mg/dL 1.0    GFR Estimate      >60 mL/min/1.73m2 >90    Cholesterol      <200 mg/dL 185    Triglycerides      <150 mg/dL 135    HDL Cholesterol      >=50 mg/dL 55    LDL Cholesterol Calculated      <=100 mg/dL 103 (H)    Non HDL Cholesterol      <130 mg/dL 130 (H)    Patient Fasting? Yes    Hemoglobin A1C      0.0 - 5.6 % 5.5       Legend:  (H) High      Breast Cancer Screening:    FHS-7:       5/27/2022     8:51 AM 5/27/2022     3:19 PM 11/6/2023    12:49 PM   Breast CA Risk Assessment (FHS-7)   Did any of your first-degree relatives have breast or ovarian cancer? No No No   Did any of your relatives have bilateral breast cancer? No No No   Did any man in your family have breast cancer? No  No   Did any woman in your family have breast and ovarian cancer? Yes  Yes   Did any woman in your family have breast cancer before age 50 y? No  No   Do you have 2 or more relatives with breast and/or ovarian cancer? No  No   Do you have 2 or more relatives with breast and/or bowel cancer? No  No       Mammogram Screening - Offered annual screening and updated Health Maintenance for [place] plan based on risk factor consideration  Pertinent mammograms are reviewed under the imaging tab.    History of abnormal Pap smear: Status post benign hysterectomy. Health Maintenance and Surgical History updated.     Reviewed and updated as needed this visit by clinical staff   Tobacco  Allergies  Meds              Reviewed and updated as needed this visit by Provider                     Review of Systems   Constitutional:  Negative for chills and fever.   HENT:  Negative for congestion, ear pain, hearing loss and sore throat.    Eyes:  Negative for pain and visual disturbance.   Respiratory:  Negative for cough and shortness of breath.    Cardiovascular:  Negative for chest pain, palpitations and peripheral edema.   Gastrointestinal:  Negative for abdominal pain,  "constipation, diarrhea, heartburn, hematochezia and nausea.   Breasts:  Negative for tenderness, breast mass and discharge.   Genitourinary:  Negative for dysuria, frequency, genital sores, hematuria, pelvic pain, urgency, vaginal bleeding and vaginal discharge.   Musculoskeletal:  Negative for arthralgias, joint swelling and myalgias.   Skin:  Negative for rash.   Neurological:  Negative for dizziness, weakness, headaches and paresthesias.   Psychiatric/Behavioral:  Negative for mood changes. The patient is not nervous/anxious.       Weight up 27 pounds. Diet Worse. Occ exercise   Rare back soreness. No radiation to LEs   Some stress with custody  issues.  With kids and ex in  [place]. Hoping for resolution by Feb 2023. PHQ9 = 4. NO suicidal ideation     OBJECTIVE:   /81   Pulse 68   Temp 97.8  F (36.6  C) (Temporal)   Ht 1.695 m (5' 6.75\")   Wt 130.5 kg (287 lb 9.6 oz)   LMP 08/18/2020 (Approximate)   SpO2 96%   BMI 45.38 kg/m    Physical Exam  General appearance -  alert, no distress  Skin - No rashes or lesions.  Head - normocephalic, atraumatic  Eyes - SELINA, EOMI, fundi exam with nondilated pupils negative.  Ears - External ears normal. Canals clear. TM's normal.  Nose/Sinuses - Nares normal. Septum midline. Mucosa normal. No drainage or sinus tenderness.  Oropharynx - No erythema, no adenopathy, no exudates.  Mildly narrowed posterior pharyngeal airway due to obesity  Neck - Supple without adenopathy or thyromegaly. No bruits.  Lungs - Clear to auscultation without wheezes/rhonchi.  Heart - Regular rate and rhythm without murmurs, clicks, or gallops.  Nodes - No supraclavicular, axillary, or inguinal adenopathy palpable.  Breasts - deferred  Abdomen - Abdomen obese, soft, non-tender. BS normal. No masses or hepatosplenomegaly palpable. No bruits.  Extremities -No cyanosis, clubbing. Minimal BLE edema.    Musculoskeletal - Spine ROM normal. Muscular strength intact.   Peripheral pulses - radial=4/4, " femoral=4/4, posterior tibial=4/4, dorsalis pedis=4/4,  Neuro - Gait normal. Reflexes normal and symmetric. Sensation grossly WNL.  Genital/Rectal - deferred          ASSESSMENT/PLAN:   1. Encounter for routine adult medical exam with abnormal findings  Screening labs as ordered.  See healthcare maintenance recommendations through plan discussion below  - Comprehensive metabolic panel; Future  - Lipid panel reflex to direct LDL Fasting; Future  - CBC with platelets; Future  - TSH with free T4 reflex; Future  - Cortisol; Future    2. Current mild episode of major depressive disorder without prior episode (H24)  Controlled.  Continue current medication.PHQ9 = 4- desvenlafaxine (PRISTIQ) 50 MG 24 hr tablet; Take 1 tablet (50 mg) by mouth daily  Dispense: 90 tablet; Refill: 3    3. Class 3 severe obesity with body mass index (BMI) of 45.0 to 49.9 in adult, unspecified obesity type, unspecified whether serious comorbidity present (H)  Weight up 27 pounds.  Contributing to elevated glucose.  Counseled regarding calorie/carbohydrate reduction.  Patient declines medication therapy such as GLP-1 agonist or other medicine at this time.  Willwork on calorie/carbohydrate reduction and increase in exercise and recheck weight in 6 months.  If not improved, patient then willing to start medication therapy  - Comprehensive metabolic panel; Future  - Lipid panel reflex to direct LDL Fasting; Future  - TSH with free T4 reflex; Future  - Cortisol; Future    4. Blood glucose elevated  Prior fasting glucose 106.  Will 27 pound since that time.  Needs lab follow-up.  If A1c and/or glucose elevated, will start metformin  - Hemoglobin A1c; Future  - Comprehensive metabolic panel; Future    5. Need for COVID-19 vaccine  - COVID-19 12+ (2023-24) (PFIZER)    6. Encounter for screening mammogram for breast cancer  Due for mammogram.  Asymptomatic  - Mammogram      Patient has been advised of split billing requirements and indicates  understanding: Yes      COUNSELING:  Reviewed preventive health counseling, as reflected in patient instructions        She reports that she quit smoking about 12 years ago. Her smoking use included cigarettes. She has never used smokeless tobacco.        PLAN:  Continue current medications  Prescriptions refilled.    Call  [phone] or use Narrable to schedule a future lab appointment  fasting in next 1-2 weeks.   For fasting labs, please refrain from eating for 8 hours or more.   Drink 2 glasses of water before your lab appointment. It is fine to take your  oral medications on the morning of the lab test as usual  Reduce calorie/carbohydrate (sugar, bread, potato, pasta, rice, alcohol etc)  intake in diet.  Increase color on your plate with vegetables. Increase  frequency of walking or other aerobic exercise as able (goal is daily)  Vaccinations: Pfizer covid vaccine  Mammogram     This will be done at the [facility]. Call [phone] or use Narrable to schedule.   Pt was informed regarding extra E&M billing for management of new or established medical issues not related to today's wellness/screening visit      Kevin Francis MD  [facility]

## 2023-11-06 NOTE — PATIENT INSTRUCTIONS
Continue current medications  Prescriptions refilled.    Call  [phone] or use mPort to schedule a future lab appointment  fasting in next 1-2 weeks.   For fasting labs, please refrain from eating for 8 hours or more.   Drink 2 glasses of water before your lab appointment. It is fine to take your  oral medications on the morning of the lab test as usual  Reduce calorie/carbohydrate (sugar, bread, potato, pasta, rice, alcohol etc)  intake in diet.  Increase color on your plate with vegetables. Increase  frequency of walking or other aerobic exercise as able (goal is daily)  Vaccinations: Pfizer covid vaccine  Mammogram     This will be done at the [facility]. Call [phone] or use mPort to schedule.   Pt was informed regarding extra E&M billing for management of new or established medical issues not related to today's wellness/screening visit

## 2023-11-13 ENCOUNTER — LAB (OUTPATIENT)
Dept: LAB | Facility: CLINIC | Age: 43
End: 2023-11-13
Payer: COMMERCIAL

## 2023-11-13 ENCOUNTER — ANCILLARY PROCEDURE (OUTPATIENT)
Dept: MAMMOGRAPHY | Facility: CLINIC | Age: 43
End: 2023-11-13
Attending: INTERNAL MEDICINE
Payer: COMMERCIAL

## 2023-11-13 DIAGNOSIS — Z12.31 ENCOUNTER FOR SCREENING MAMMOGRAM FOR BREAST CANCER: ICD-10-CM

## 2023-11-13 DIAGNOSIS — E66.01 CLASS 3 SEVERE OBESITY WITH BODY MASS INDEX (BMI) OF 45.0 TO 49.9 IN ADULT, UNSPECIFIED OBESITY TYPE, UNSPECIFIED WHETHER SERIOUS COMORBIDITY PRESENT (H): ICD-10-CM

## 2023-11-13 DIAGNOSIS — Z00.01 ENCOUNTER FOR ROUTINE ADULT MEDICAL EXAM WITH ABNORMAL FINDINGS: ICD-10-CM

## 2023-11-13 DIAGNOSIS — E66.813 CLASS 3 SEVERE OBESITY WITH BODY MASS INDEX (BMI) OF 45.0 TO 49.9 IN ADULT, UNSPECIFIED OBESITY TYPE, UNSPECIFIED WHETHER SERIOUS COMORBIDITY PRESENT (H): ICD-10-CM

## 2023-11-13 DIAGNOSIS — Z12.31 VISIT FOR SCREENING MAMMOGRAM: ICD-10-CM

## 2023-11-13 DIAGNOSIS — R73.9 BLOOD GLUCOSE ELEVATED: ICD-10-CM

## 2023-11-13 LAB
ALBUMIN SERPL BCG-MCNC: 3.9 G/DL (ref 3.5–5.2)
ALP SERPL-CCNC: 105 U/L (ref 35–104)
ALT SERPL W P-5'-P-CCNC: 60 U/L (ref 0–50)
ANION GAP SERPL CALCULATED.3IONS-SCNC: 9 MMOL/L (ref 7–15)
AST SERPL W P-5'-P-CCNC: 48 U/L (ref 0–45)
BILIRUB SERPL-MCNC: 0.7 MG/DL
BUN SERPL-MCNC: 9.2 MG/DL (ref 6–20)
CALCIUM SERPL-MCNC: 9 MG/DL (ref 8.6–10)
CHLORIDE SERPL-SCNC: 105 MMOL/L (ref 98–107)
CHOLEST SERPL-MCNC: 170 MG/DL
CORTIS SERPL-MCNC: 11.8 UG/DL
CREAT SERPL-MCNC: 0.69 MG/DL (ref 0.51–0.95)
DEPRECATED HCO3 PLAS-SCNC: 25 MMOL/L (ref 22–29)
EGFRCR SERPLBLD CKD-EPI 2021: >90 ML/MIN/1.73M2
ERYTHROCYTE [DISTWIDTH] IN BLOOD BY AUTOMATED COUNT: 11.8 % (ref 10–15)
GLUCOSE SERPL-MCNC: 107 MG/DL (ref 70–99)
HBA1C MFR BLD: 5.7 % (ref 0–5.6)
HCT VFR BLD AUTO: 41.7 % (ref 35–47)
HDLC SERPL-MCNC: 46 MG/DL
HGB BLD-MCNC: 14 G/DL (ref 11.7–15.7)
LDLC SERPL CALC-MCNC: 97 MG/DL
MCH RBC QN AUTO: 29.9 PG (ref 26.5–33)
MCHC RBC AUTO-ENTMCNC: 33.6 G/DL (ref 31.5–36.5)
MCV RBC AUTO: 89 FL (ref 78–100)
NONHDLC SERPL-MCNC: 124 MG/DL
PLATELET # BLD AUTO: 235 10E3/UL (ref 150–450)
POTASSIUM SERPL-SCNC: 4.4 MMOL/L (ref 3.4–5.3)
PROT SERPL-MCNC: 6.8 G/DL (ref 6.4–8.3)
RBC # BLD AUTO: 4.69 10E6/UL (ref 3.8–5.2)
SODIUM SERPL-SCNC: 139 MMOL/L (ref 135–145)
TRIGL SERPL-MCNC: 133 MG/DL
TSH SERPL DL<=0.005 MIU/L-ACNC: 1.06 UIU/ML (ref 0.3–4.2)
WBC # BLD AUTO: 8.2 10E3/UL (ref 4–11)

## 2023-11-13 PROCEDURE — 80053 COMPREHEN METABOLIC PANEL: CPT

## 2023-11-13 PROCEDURE — 77067 SCR MAMMO BI INCL CAD: CPT | Mod: TC | Performed by: RADIOLOGY

## 2023-11-13 PROCEDURE — 84443 ASSAY THYROID STIM HORMONE: CPT

## 2023-11-13 PROCEDURE — 36415 COLL VENOUS BLD VENIPUNCTURE: CPT

## 2023-11-13 PROCEDURE — 85027 COMPLETE CBC AUTOMATED: CPT

## 2023-11-13 PROCEDURE — 77063 BREAST TOMOSYNTHESIS BI: CPT | Mod: TC | Performed by: RADIOLOGY

## 2023-11-13 PROCEDURE — 82533 TOTAL CORTISOL: CPT

## 2023-11-13 PROCEDURE — 83036 HEMOGLOBIN GLYCOSYLATED A1C: CPT

## 2023-11-13 PROCEDURE — 80061 LIPID PANEL: CPT

## 2023-11-21 ENCOUNTER — MYC MEDICAL ADVICE (OUTPATIENT)
Dept: INTERNAL MEDICINE | Facility: CLINIC | Age: 43
End: 2023-11-21
Payer: COMMERCIAL

## 2023-11-27 NOTE — TELEPHONE ENCOUNTER
Pt wants to know if she should be on Ozempic based on Hemoglobin A1C results. Please review lab results and advice. Should she schedule VV to discuss lab results?     Pt is also concerned of elevated liver functions.   Triage advised pt to avoid alcohol, OTC supplements and tylenol until further directed.     Pt is requesting a call back with advice.

## 2023-11-30 NOTE — TELEPHONE ENCOUNTER
Pt called to check on this     Asking that PCP put in a note or call back regarding labs/recommendations     Renee HYATT, Triage RN  [facility]

## 2023-12-05 NOTE — TELEPHONE ENCOUNTER
Patient calling back again to check on request from below. Patient states that she is very frustrated as liver enzymes were elevated and HgA1C is prediabetic and she would like to know if she qualifies for ozempic. Patient states she is willing to make a VV if PCP advises.    PCP, please advise if lab results from 11/13/23 can be commented on, as well as if patient might qualify for ozempic.     Callback: [phone] ok to leave a detailed vm    Johana Richter RN  -[facility]

## 2023-12-06 NOTE — TELEPHONE ENCOUNTER
Tried to call pt. DOMINGA AGARWAL that I called and would try calling again tomorrow. Minimal elevation of LFTS c/w probable mld fatty liver with weight and A1C minimally above normal and not to point of diabetes. Ozempic only indicated in diabetes. Will discuss with pt whether she wishes to have PA submitted for Wegovy for dx of obesity vs starting Metformin to address the insulin resistance/mld glucose elevation  with oral med treatment

## 2023-12-07 ENCOUNTER — TELEPHONE (OUTPATIENT)
Dept: INTERNAL MEDICINE | Facility: CLINIC | Age: 43
End: 2023-12-07
Payer: COMMERCIAL

## 2023-12-07 DIAGNOSIS — R73.9 BLOOD GLUCOSE ELEVATED: ICD-10-CM

## 2023-12-07 DIAGNOSIS — E66.01 MORBID OBESITY (H): Primary | ICD-10-CM

## 2023-12-07 NOTE — TELEPHONE ENCOUNTER
CC: Patient returning call attempt from PCP. Writer reviewed PCP's message with patient who expressed understanding and states she would like to proceed with PA for Wegovy. Routing back to PCP to please send rx for Wegovy - then route back to triage to route encounter to PA team.    Patient's pharmacy:   [facility] [place], [place] - [address] [place] [address].     Routing to PCP to please address - thank you!     Callback [phone] - ok to leave detailed VM     Zainab Ayala RN  [facility]

## 2023-12-08 NOTE — TELEPHONE ENCOUNTER
Please submit to pt's insurance a prior auth request for Wegovy 0.25mg/week injection with plan for future dose titration per protocol for morbid obesity (BMI 45.38), insulin resistance with mild glucose and A1C elevation and mild hepatitis (probable steatohepatitis from obesity). Pt has tried and failed previous diet regimens. Has some back pain issues that is limiting ability to exercise. Would greatly benefit from use of Wegovy to control metabolic/endocrine issues and musculoskeletal issues. Route decision from insurance company back to me to review when received

## 2023-12-08 NOTE — TELEPHONE ENCOUNTER
PA submitted to [facility] PA team for Wegovy with separate TE encounter today. Will await response from insurance. Rx tentatively sent to [facility] pharmacy. Will await response. If not covered, will start Metformin. If Wegovy approved, will then start and titrate dose every month per protocol as tolerated. Discussed with pt along with recent labs. Requested that PA team send me update re: insurance decision through the other encounter

## 2023-12-11 NOTE — TELEPHONE ENCOUNTER
Prior Authorization Retail Medication Request    Medication/Dose: wegovy  Diagnosis and ICD code (if different than what is on RX):    New/renewal/insurance change PA/secondary ins. PA:  Previously Tried and Failed:    Rationale:      Insurance   Primary:   Insurance ID:      Secondary (if applicable):  Insurance ID:      Pharmacy Information (if different than what is on RX)  Name:  [facility]  Phone:  [phone]  Fax:

## 2023-12-11 NOTE — TELEPHONE ENCOUNTER
Central Prior Authorization Team   Phone: [phone]    PA Initiation    Medication: WEGOVY 0.25 MG/0.5ML SC SOAJ  Insurance Company: Other (see comments)Comment:  [facility]  Pharmacy Filling the Rx: [facility] [place], [place] - [address] [place] [address]  Filling Pharmacy Phone: [phone]  Filling Pharmacy Fax:    Start Date: 12/11/2023    Insurance already has a PA on file.  Called pharmacy, they have received a paid claim but they do not have the medication in.

## 2024-01-22 ENCOUNTER — TRANSFERRED RECORDS (OUTPATIENT)
Dept: HEALTH INFORMATION MANAGEMENT | Facility: CLINIC | Age: 44
End: 2024-01-22
Payer: COMMERCIAL

## 2024-02-01 ENCOUNTER — TRANSFERRED RECORDS (OUTPATIENT)
Dept: HEALTH INFORMATION MANAGEMENT | Facility: CLINIC | Age: 44
End: 2024-02-01
Payer: COMMERCIAL

## 2024-03-18 ENCOUNTER — TELEPHONE (OUTPATIENT)
Dept: INTERNAL MEDICINE | Facility: CLINIC | Age: 44
End: 2024-03-18
Payer: COMMERCIAL

## 2024-03-18 DIAGNOSIS — E66.01 MORBID OBESITY (H): Primary | ICD-10-CM

## 2024-03-18 NOTE — TELEPHONE ENCOUNTER
Generally the titration of dose is done every month until reaching max dose months down the road of 2.4mg/week. If tolerating 0.25mg/week dose OK, will increase to 0.5mg/week dose but will not write for 3 mos as will hopefully increase dose further in a month. Pt tostart 0.5mg/week injection 7 days after her past dose of 0.25mg taken and do this for 4 weeks and then send update message in SmartMove 2 days after the 4th dose of 0.5mg is taken. If doing OK with 0.5mg, will then increase dosage after that to 1mg injections

## 2024-03-18 NOTE — TELEPHONE ENCOUNTER
Called and spoke with pt to relay provider note below.   She verbalized understanding and declined further questions at this time.     Thank you,  Rand Lino RN

## 2024-03-18 NOTE — TELEPHONE ENCOUNTER
Pt is requesting new rx for    Wegovy 0.5mg/0.5ml soaj    Did not see on active med list please verify and send new rx. Thank you!    [facility]mail pharmacy  [phone]

## 2024-03-18 NOTE — TELEPHONE ENCOUNTER
Pt called the clinic asking for a dose increase.     Pt would like to go up on the dose to 0.5mg of Wegovy as she is tolerating it okay.     Pt is asking if PCP can write a script for the next 3 months with the increases and pt will call back if not tolerating.     Pt would like script sent to  [facility].

## 2024-03-19 NOTE — TELEPHONE ENCOUNTER
Medication was sent 3/18/24 to the pharmacy. Closing encounter.     Mojgan BREWSTER RN  [facility] Triage Team

## 2024-03-19 NOTE — TELEPHONE ENCOUNTER
CC: Patient is calling back regarding this.    Pharmacy does not have any stock of 0.5 mg dosage that was sent by PCP. Pharmacy unable to say when they may be getting more stock.     0.25 mg is not very beneficial per patient and is wondering what next steps should be taken. Patient willing to skip to a higher dose or consider changing to a different medication if PCP has a specific recommendation?    Routing to Dr. Francis to please review and advise - thank you!    Callback [phone] - ok to leave detailed VM     Zainab Ayala RN  [facility]

## 2024-03-20 ENCOUNTER — MYC MEDICAL ADVICE (OUTPATIENT)
Dept: INTERNAL MEDICINE | Facility: CLINIC | Age: 44
End: 2024-03-20
Payer: COMMERCIAL

## 2024-03-20 DIAGNOSIS — E66.01 MORBID OBESITY (H): ICD-10-CM

## 2024-03-20 NOTE — TELEPHONE ENCOUNTER
See FYI from patient in mychart.    If any action is needed, please route to the appropriate pool (ie. Triage or Team).    Clemente Kirk, RN  [facility] Triage Nurse

## 2024-03-21 NOTE — TELEPHONE ENCOUNTER
See [facility] encounter 3/20/24. Rx done for Wegovy 0.5mg/week dose and sent to  [facility] pharmacy who had med in stock per pt and pt was notified in [facility]

## 2024-04-16 ENCOUNTER — MYC MEDICAL ADVICE (OUTPATIENT)
Dept: INTERNAL MEDICINE | Facility: CLINIC | Age: 44
End: 2024-04-16
Payer: COMMERCIAL

## 2024-04-16 DIAGNOSIS — E66.01 MORBID OBESITY (H): Primary | ICD-10-CM

## 2024-04-17 ENCOUNTER — TELEPHONE (OUTPATIENT)
Dept: INTERNAL MEDICINE | Facility: CLINIC | Age: 44
End: 2024-04-17
Payer: COMMERCIAL

## 2024-04-17 DIAGNOSIS — E66.01 MORBID OBESITY (H): Primary | ICD-10-CM

## 2024-04-23 NOTE — TELEPHONE ENCOUNTER
Received this note after PA requested:    Hello,   This patient falls under the new rules of weight loss injectables for [employer].   Patient must follow the below steps to start/continue their therapy for weight management.    There are three conditions to coverage:   o You need to meet the initial clinical criteria of having a BMI of 40kg/m2    or over.   o The medication will only be covered if it is prescribed through a hospital-based   Medication Therapy Management (MTM) program at the [facility]. Visits can be in-person, virtual via video, or over the   phone.   ? You will need to establish care through this program. Scheduling has not yet started.   You will receive more scheduling information in the upcoming weeks.   o To qualify for coverage, your prescription for Wegovy or Saxenda must be filled through   the [facility] at [phone] or [phone].       Spoke with [facility] clinic and they are now scheduling appointments for   Employees for weight loss management.  Referral placed to [facility].  Called patient and she states that she now has an appointment scheduled with [facility] for 5/31/2024 to address Wegovy management

## 2024-04-25 NOTE — TELEPHONE ENCOUNTER
Per info when requested PA, everything will have to go through MTM program so will have to wait until scheduled appt and can discuss with MTM pharmacist at that time. If doping before then, would have to pay out of med out of pocket

## 2024-04-25 NOTE — TELEPHONE ENCOUNTER
Relayed providers message to patient. Patient will wait until MTM appt, is unable to pay for medication OOP.

## 2024-04-25 NOTE — TELEPHONE ENCOUNTER
Pt called the clinic.   She stated she made an appt with MTM (see provider note below), but it isn't until 5/31/24.    She is wondering what if anything she can do in the meantime to get this medication covered until her appt.   If there are no options for coverage before her MTM appt, she is wondering if she will need to start all over again on the decreased dose in the future, or if she can start back on the 1 mg right away after her MTM appt.     Routing to PCP to advise.  Thank you,  Rand Lino RN

## 2024-05-10 ENCOUNTER — TELEPHONE (OUTPATIENT)
Dept: CARDIOLOGY | Facility: CLINIC | Age: 44
End: 2024-05-10
Payer: COMMERCIAL

## 2024-05-10 ENCOUNTER — VIRTUAL VISIT (OUTPATIENT)
Dept: CARDIOLOGY | Facility: CLINIC | Age: 44
End: 2024-05-10
Attending: INTERNAL MEDICINE
Payer: COMMERCIAL

## 2024-05-10 VITALS — BODY MASS INDEX: 43.17 KG/M2 | WEIGHT: 273.6 LBS

## 2024-05-10 DIAGNOSIS — E66.01 MORBID OBESITY (H): Primary | ICD-10-CM

## 2024-05-10 NOTE — NURSING NOTE
Is the patient currently in the state of [place]? YES    Visit mode:TELEPHONE    If the visit is dropped, the patient can be reconnected by: TELEPHONE VISIT: Phone number:   Telephone Information:   Mobile [phone]       Will anyone else be joining the visit? NO  (If patient encounters technical issues they should call [phone]    How would you like to obtain your AVS? MyChart    Are changes needed to the allergy or medication list? No    Are refills needed on medications prescribed by this physician? NO    Reason for visit: Consult    Amie CRUZ

## 2024-05-10 NOTE — TELEPHONE ENCOUNTER
PA Initiation    Medication: WEGOVY 0.5 MG/0.5ML SC SOAJ  Insurance Company: [employer] - Phone [phone] Fax [phone]  Pharmacy Filling the Rx:    Filling Pharmacy Phone:    Filling Pharmacy Fax:    Start Date: 5/10/2024    Key:[id number]

## 2024-05-10 NOTE — TELEPHONE ENCOUNTER
Hello team,    Forwarding encounter as a request to initiate prior authorization for Wegovy  0.5 mg weekly. Please let me know if you need any additional details or information.    Thank you for your help,  Micah

## 2024-05-10 NOTE — Clinical Note
Elida Francis,  Sending this message as an FYI, I spoke with Shannen as a part of the [facility] insurance requirements for GLP-1 agonists.  She had been doing well with Wegovy and I will attempt to have her continue hopefully without interruption with submission of orders and prior authorization today.  Please let me know if you have any questions or concerns.  Thank you, Micah

## 2024-05-10 NOTE — PATIENT INSTRUCTIONS
"Recommendations from today's MTM visit:                                                    MTM (medication therapy management) is a service provided by a clinical pharmacist designed to help you get the most of out of your medicines.      I will submit orders for you to remain on Wegovy 0.5 mg once weekly for 4 weeks, then increase to 1 mg once weekly for 4 weeks then increase to 1.7 mg once weekly thereafter.     You may remain on any dose that is effective without notable adverse effects.     I will notify you upon the approval of the prior authorization via [employer].     My voicemail: [phone]    It was great speaking with you today.  I value your experience and would be very thankful for your time in providing feedback in our clinic survey. In the next few days, you may receive an email or text message from [employer] with a link to a survey related to your  clinical pharmacist.\"     To schedule another MTM appointment, please call the clinic directly or you may call the MTM scheduling line at [phone].    My Clinical Pharmacist's contact information:                                                      Please feel free to contact me with any questions or concerns you have.      Kurt Pearson, PharmD, BCACP  Medication Therapy Management Pharmacist  [facility]    "

## 2024-05-10 NOTE — Clinical Note
5/10/2024      RE: Shannen Evans  [address] [address]       Dear Colleague,    Thank you for the opportunity to participate in the care of your patient, Shannen Evans, at the [facility] [place] [facility]. Please see a copy of my visit note below.    Medication Therapy Management (MTM) Encounter    ASSESSMENT:                            Medication Adherence/Access: See below for considerations    Weight management: Recently initiated Wegovy at risk of interruption as a result of insurance barriers.  Initiation has been well-tolerated with limited and transient nausea, with positive effects on satiety and appetite.  Pretreatment BMI greater than 40 kg/m . Negative history of pancreatitis, medullary thyroid cancer and multiple endocrine neoplasia type 2.  Essentially negative baseline GI symptomatology.  Will attempt to enable continuation of titration scheme with submission of prior authorization today, plans to continue 0.5 mg once weekly and titrate.  Reviewed mechanism, administration, safety, monitoring, adverse effects with Wegovy.    For patients that are under [employer] insurance coverage, it is mandated by insurance that each qualifying patient meet with hospital based Weight Management Medication Therapy Management pharmacist to continue therapy coverage. The following patient meets the below coverage criteria and can therefore continue GLP-1/GIP agonist therapy for Weight Management:    Adult  BMI >40 with or without comorbidities   OR   BMI >30 + NAFLD*   at time of initiating GLP-1/GIP agonist therapy Approved for 29 weeks  Met Updated Initial Criteria   At least 5% weight loss of baseline body weight  Approved for 12 months        PLAN:                            I will submit orders for you to remain on Wegovy 0.5 mg once weekly for 4 weeks, then increase to 1 mg once weekly for 4  weeks then increase to 1.7 mg once weekly thereafter.    You may remain on any dose that is effective without notable adverse effects.    I will notify you upon the approval of the prior authorization via [employer].    My voicemail: [phone]    SUBJECTIVE/OBJECTIVE:                          Shannen Evans is a 44 year old female called for an initial visit. She was referred to me from [facility] insurance requirements .      Reason for visit: Wegovy consult.    Allergies/ADRs: Reviewed in chart  Past Medical History: Reviewed in chart  Tobacco: She reports that she quit smoking about 12 years ago. Her smoking use included cigarettes. She has never used smokeless tobacco.      Medication Adherence/Access: See above    Weight management:  Phone consult to discuss ongoing use of Wegovy.  Last dose of Wegovy 0.5 mg was roughly 2 weeks ago and has been unable to refill as a result of insurance requirements. Notes some occasional nausea with Wegovy, transient and self limited. Not every day. Notes subtle effects on appetite, also specifically early satiety and sustained satiety. Has struggled with weight since college and subsequently difficulties with weight gain during prolonged high stress periods with stress at home.  Hoping to facilitate sustained weight loss with use of Wegovy.    Medication History: Negative   Fluid/Water intake: Doing better of late, 40 oz thermos 1.5 of these daily  Diet: Notes that due to 12 hour shifts, sometimes tends towards fast food. Does also comment on emotional eating, but in general terms feels she eats protein emphasis and vegetables, generally healthy   Physical activity: Working her way back, stationary bike, previously torn meniscus, does have weights     Medical History:  MEN2/Medullary Thyroid Cancer: Negative   Pancreatitis: Negative   Baseline GI symptomatology: Negative, if anything had her gallbladder out previously     Wt Readings from Last 4 Encounters:   05/10/24 124.1 kg (273  lb 9.6 oz)   11/06/23 130.5 kg (287 lb 9.6 oz)   01/14/23 117.9 kg (260 lb)   05/27/22 122 kg (269 lb)     Body Mass Index (BMI) Body mass index is 43.17 kg/m .    Today's Vitals: Wt 124.1 kg (273 lb 9.6 oz)   LMP 08/18/2020 (Approximate)   BMI 43.17 kg/m      Lab Results   Component Value Date    A1C 5.7 11/13/2023    A1C 5.5 05/27/2022       ----------------      I spent 30 minutes with this patient today. All changes were made via collaborative practice agreement with Layne Gauthier PA-C . A copy of the visit note was provided to the patient's provider(s).    A summary of these recommendations was sent via [employer].    Kurt Pearson, PharmD, BCACP  Medication Therapy Management Pharmacist  [facility]     Telemedicine Visit Details  Type of service:  Telephone visit  Start Time:  1200pm  End Time:  1230pm     Medication Therapy Recommendations  No medication therapy recommendations to display           Please do not hesitate to contact me if you have any questions/concerns.     Sincerely,     KURT PEARSON RPH

## 2024-05-10 NOTE — PROGRESS NOTES
Medication Therapy Management (MTM) Encounter    ASSESSMENT:                            Medication Adherence/Access: See below for considerations    Weight management: Recently initiated Wegovy at risk of interruption as a result of insurance barriers.  Initiation has been well-tolerated with limited and transient nausea, with positive effects on satiety and appetite.  Pretreatment BMI greater than 40 kg/m . Negative history of pancreatitis, medullary thyroid cancer and multiple endocrine neoplasia type 2.  Essentially negative baseline GI symptomatology.  Will attempt to enable continuation of titration scheme with submission of prior authorization today, plans to continue 0.5 mg once weekly and titrate.  Reviewed mechanism, administration, safety, monitoring, adverse effects with Wegovy.    For patients that are under [employer] insurance coverage, it is mandated by insurance that each qualifying patient meet with hospital based Weight Management Medication Therapy Management pharmacist to continue therapy coverage. The following patient meets the below coverage criteria and can therefore continue GLP-1/GIP agonist therapy for Weight Management:    Adult  BMI >40 with or without comorbidities   OR   BMI >30 + NAFLD*   at time of initiating GLP-1/GIP agonist therapy Approved for 29 weeks  Met Updated Initial Criteria   At least 5% weight loss of baseline body weight  Approved for 12 months        PLAN:                            I will submit orders for you to remain on Wegovy 0.5 mg once weekly for 4 weeks, then increase to 1 mg once weekly for 4 weeks then increase to 1.7 mg once weekly thereafter.    You may remain on any dose that is effective without notable adverse effects.    I will notify you upon the approval of the prior authorization via [employer].    My voicemail: [phone]    SUBJECTIVE/OBJECTIVE:                          Shannen Evans is a 44 year old female called for an  initial visit. She was referred to me from [facility] insurance requirements .      Reason for visit: Wegovy consult.    Allergies/ADRs: Reviewed in chart  Past Medical History: Reviewed in chart  Tobacco: She reports that she quit smoking about 12 years ago. Her smoking use included cigarettes. She has never used smokeless tobacco.      Medication Adherence/Access: See above    Weight management:  Phone consult to discuss ongoing use of Wegovy.  Last dose of Wegovy 0.5 mg was roughly 2 weeks ago and has been unable to refill as a result of insurance requirements. Notes some occasional nausea with Wegovy, transient and self limited. Not every day. Notes subtle effects on appetite, also specifically early satiety and sustained satiety. Has struggled with weight since college and subsequently difficulties with weight gain during prolonged high stress periods with stress at home.  Hoping to facilitate sustained weight loss with use of Wegovy.    Medication History: Negative   Fluid/Water intake: Doing better of late, 40 oz thermos 1.5 of these daily  Diet: Notes that due to 12 hour shifts, sometimes tends towards fast food. Does also comment on emotional eating, but in general terms feels she eats protein emphasis and vegetables, generally healthy   Physical activity: Working her way back, stationary bike, previously torn meniscus, does have weights     Medical History:  MEN2/Medullary Thyroid Cancer: Negative   Pancreatitis: Negative   Baseline GI symptomatology: Negative, if anything had her gallbladder out previously     Wt Readings from Last 4 Encounters:   05/10/24 124.1 kg (273 lb 9.6 oz)   11/06/23 130.5 kg (287 lb 9.6 oz)   01/14/23 117.9 kg (260 lb)   05/27/22 122 kg (269 lb)     Body Mass Index (BMI) Body mass index is 43.17 kg/m .    Today's Vitals: Wt 124.1 kg (273 lb 9.6 oz)   LMP 08/18/2020 (Approximate)   BMI 43.17 kg/m      Lab Results   Component Value Date    A1C 5.7 11/13/2023    A1C 5.5 05/27/2022        ----------------      I spent 30 minutes with this patient today. All changes were made via collaborative practice agreement with Layne Gauthier PA-C . A copy of the visit note was provided to the patient's provider(s).    A summary of these recommendations was sent via [employer].    Kurt Pearson PharmD, BCACP  Medication Therapy Management Pharmacist  [facility]     Telemedicine Visit Details  Type of service:  Telephone visit  Start Time:  1200pm  End Time:  1230pm     Medication Therapy Recommendations  No medication therapy recommendations to display

## 2024-05-10 NOTE — PROGRESS NOTES
"Virtual Visit Details    Type of service:  Telephone Visit   Phone call duration: *** minutes   Originating Location (pt. Location): {patient location:008898::\"Home\"}  {PROVIDER LOCATION On-site should be selected for visits conducted from your clinic location or adjoining [facility] hospital, academic office, or other nearby [facility] building. Off-site should be selected for all other provider locations, including home:797643}  Distant Location (provider location):  {virtual location provider:690905}  "

## 2024-05-16 ENCOUNTER — MYC MEDICAL ADVICE (OUTPATIENT)
Dept: CARDIOLOGY | Facility: CLINIC | Age: 44
End: 2024-05-16
Payer: COMMERCIAL

## 2024-05-16 DIAGNOSIS — E66.01 MORBID OBESITY (H): Primary | ICD-10-CM

## 2024-05-28 NOTE — TELEPHONE ENCOUNTER
Prior Authorization Approval    Medication: WEGOVY 0.5 MG/0.5ML SC SOAJ  Authorization Effective Date: 5/15/2024  Authorization Expiration Date: 5/15/2025  Approved Dose/Quantity: 2ml/28 days   Reference #: [id number]   Insurance Company: [employer] - Phone [phone] Fax [phone]  Expected CoPay: $ 11  CoPay Card Available:      Financial Assistance Needed: no  Which Pharmacy is filling the prescription:    Pharmacy Notified: no  Patient Notified:

## 2024-08-08 ENCOUNTER — VIRTUAL VISIT (OUTPATIENT)
Dept: CARDIOLOGY | Facility: CLINIC | Age: 44
End: 2024-08-08
Attending: PHYSICIAN ASSISTANT
Payer: COMMERCIAL

## 2024-08-08 VITALS — HEIGHT: 67 IN | BODY MASS INDEX: 41.44 KG/M2 | WEIGHT: 264 LBS

## 2024-08-08 DIAGNOSIS — E66.01 MORBID OBESITY (H): Primary | ICD-10-CM

## 2024-08-08 ASSESSMENT — PAIN SCALES - GENERAL: PAINLEVEL: NO PAIN (0)

## 2024-08-08 NOTE — NURSING NOTE
Current patient location: [address] [address]    Is the patient currently in the state of [place]? YES    Visit mode:TELEPHONE    If the visit is dropped, the patient can be reconnected by: TELEPHONE VISIT: Phone number:   Telephone Information:   Mobile [phone]       Will anyone else be joining the visit? NO  (If patient encounters technical issues they should call [phone])    How would you like to obtain your AVS? MyChart    Are changes needed to the allergy or medication list? No    Are refills needed on medications prescribed by this physician? NO    Rooming Documentation:  Not applicable      Reason for visit: Medication Therapy Management    Fuentes CRUZ

## 2024-08-08 NOTE — Clinical Note
8/8/2024      RE: Shannen Evans  [address] [address]       Dear Colleague,    Thank you for the opportunity to participate in the care of your patient, Shannen Evans, at the [facility]. Please see a copy of my visit note below.    No notes on file    Please do not hesitate to contact me if you have any questions/concerns.     Sincerely,     HAWA SILVER RPH

## 2024-08-08 NOTE — PATIENT INSTRUCTIONS
"Recommendations from today's MTM visit:                                                    MTM (medication therapy management) is a service provided by a clinical pharmacist designed to help you get the most of out of your medicines.      Complete your supply of Wegovy 1 mg once weekly, then increase to 1.7 mg once weekly for at least 4 weeks.     If you have an adequate response without side effects at 1.7 mg, continue on that dose.  Otherwise, increase to 2.4 mg once weekly.     Follow-up with Stephanie RamirezD. as scheduled in November.    It was great speaking with you today.  I value your experience and would be very thankful for your time in providing feedback in our clinic survey. In the next few days, you may receive an email or text message from [employer] with a link to a survey related to your  clinical pharmacist.\"     To schedule another MTM appointment, please call the clinic directly or you may call the MTM scheduling line at [phone].    My Clinical Pharmacist's contact information:                                                      Please feel free to contact me with any questions or concerns you have.      Julien HansenD, BCACP  Medication Therapy Management Pharmacist  [facility]    "

## 2024-08-08 NOTE — PROGRESS NOTES
Medication Therapy Management (MTM) Encounter    ASSESSMENT:                            Medication Adherence/Access: No issues identified    Weight management: Subtle but positive weight loss progress with Wegovy that has been restarted and titrated to 1 mg weekly.  Therapy is well-tolerated with only adverse effects limited to mild constipation and acid reflux both of which are transient, neither of which require intervention.  Effects on appetite control and satiety generation are balanced, enabling appropriate nutritional modification.  Because of her well-tolerated response but subtle weight loss to date, advised completion of the 1 mg strength and titration to 1.7 mg, utilizing that dose if it yields adequate effect, increasing to 2.4 mg if needed.      PLAN:                            Complete your supply of Wegovy 1 mg once weekly, then increase to 1.7 mg once weekly for at least 4 weeks.    If you have an adequate response without side effects at 1.7 mg, continue on that dose.  Otherwise, increase to 2.4 mg once weekly.    Follow-up with Stephanie RamirezD. as scheduled in November.    SUBJECTIVE/OBJECTIVE:                          Shannen Evans is a 44 year old female called for a follow-up visit.       Reason for visit: Wegovy follow up.    Allergies/ADRs: Reviewed in chart  Past Medical History: Reviewed in chart  Tobacco: She reports that she quit smoking about 13 years ago. Her smoking use included cigarettes. She has never used smokeless tobacco.      Medication Adherence/Access: no issues reported    Weight management:  Wegovy 1 mg weekly    Restarted Wegovy as a result of previous interruption, now has administered 2 injections at 1 mg weekly. Comments on some occasional, transient nausea. Overall well tolerated, no vomiting. Because of prolonged interval previously, did have to re-titrate restarting at 0.25 mg weekly. Notes some mild, tolerable constipation. Has not required stool softeners,  "responsive to increased water intake. She has observed some modest weight loss, is encouraged by this to date. Noticing early satiety, reducing portions. Desire to snack has also been reduced. Enabled more mindful eating techniques. Does not feel effects have been over-restrictive on appetite. Also comments on mild, self limited heart burn, does not require any intervention.     Wt Readings from Last 4 Encounters:   08/08/24 119.7 kg (264 lb)   05/10/24 124.1 kg (273 lb 9.6 oz)   11/06/23 130.5 kg (287 lb 9.6 oz)   01/14/23 117.9 kg (260 lb)     Body Mass Index (BMI) Body mass index is 41.35 kg/m .    Today's Vitals: Ht 1.702 m (5' 7\")   Wt 119.7 kg (264 lb)   LMP 08/18/2020 (Approximate)   BMI 41.35 kg/m      Lab Results   Component Value Date    A1C 5.7 11/13/2023    A1C 5.5 05/27/2022     ----------------      I spent 15 minutes with this patient today. All changes were made via collaborative practice agreement with Layne Gauthier. A copy of the visit note was provided to the patient's provider(s).    A summary of these recommendations was sent via [employer].    Kurt Pearson, PharmD, BCACP  Medication Therapy Management Pharmacist  [facility]     Telemedicine Visit Details  Type of service:  Telephone visit  Start Time:  300pm  End Time:  315pm     Medication Therapy Recommendations  No medication therapy recommendations to display     "

## 2024-10-07 ENCOUNTER — PATIENT OUTREACH (OUTPATIENT)
Dept: CARE COORDINATION | Facility: CLINIC | Age: 44
End: 2024-10-07
Payer: COMMERCIAL

## 2024-10-21 ENCOUNTER — PATIENT OUTREACH (OUTPATIENT)
Dept: CARE COORDINATION | Facility: CLINIC | Age: 44
End: 2024-10-21
Payer: COMMERCIAL

## 2024-11-08 ENCOUNTER — VIRTUAL VISIT (OUTPATIENT)
Dept: PHARMACY | Facility: CLINIC | Age: 44
End: 2024-11-08
Attending: INTERNAL MEDICINE
Payer: COMMERCIAL

## 2024-11-08 VITALS — HEIGHT: 67 IN | BODY MASS INDEX: 41.35 KG/M2

## 2024-11-08 DIAGNOSIS — E66.813 CLASS 3 SEVERE OBESITY WITH SERIOUS COMORBIDITY AND BODY MASS INDEX (BMI) OF 40.0 TO 44.9 IN ADULT, UNSPECIFIED OBESITY TYPE (H): Primary | ICD-10-CM

## 2024-11-08 DIAGNOSIS — F32.9 MAJOR DEPRESSIVE DISORDER, REMISSION STATUS UNSPECIFIED, UNSPECIFIED WHETHER RECURRENT: ICD-10-CM

## 2024-11-08 DIAGNOSIS — E66.01 CLASS 3 SEVERE OBESITY WITH SERIOUS COMORBIDITY AND BODY MASS INDEX (BMI) OF 40.0 TO 44.9 IN ADULT, UNSPECIFIED OBESITY TYPE (H): Primary | ICD-10-CM

## 2024-11-08 DIAGNOSIS — Z78.9 TAKES DIETARY SUPPLEMENTS: ICD-10-CM

## 2024-11-08 DIAGNOSIS — J30.2 SEASONAL ALLERGIC RHINITIS: ICD-10-CM

## 2024-11-08 DIAGNOSIS — R73.03 PREDIABETES: ICD-10-CM

## 2024-11-08 ASSESSMENT — PAIN SCALES - GENERAL: PAINLEVEL_OUTOF10: NO PAIN (0)

## 2024-11-08 NOTE — Clinical Note
[employer] employee. Stopping wegovy due to no coverage next year and too expensive. Interested in starting Qsymia. No contraindications. Dr. Francis, would you be okay starting Qsymia (phentermine/topiramate)? If so I can order. She will not be following with weight management clinic moving forward. Thanks! Rand Oconnor, PharmD, [facility] Medication Therapy Management Pharmacist

## 2024-11-08 NOTE — NURSING NOTE
Current patient location: [address] [address]    Is the patient currently in the state of [place]? YES    Visit mode:TELEPHONE    If the visit is dropped, the patient can be reconnected by: TELEPHONE VISIT: Phone number:   Telephone Information:   Mobile [phone]       Will anyone else be joining the visit? NO  (If patient encounters technical issues they should call [phone]    Are changes needed to the allergy or medication list? No    Are refills needed on medications prescribed by this physician? NO    Rooming Documentation:  Patient declined to complete questionnaire(s) /phq2    Reason for visit: Medication Therapy Management    Alana CHARLESF

## 2024-11-08 NOTE — PROGRESS NOTES
Medication Therapy Management (MTM) Encounter    ASSESSMENT:                            Medication Adherence/Access: See below for considerations.    Weight Management /pre-diabetes:  Weight loss progressed after first starting Wegovy. No updated weight today. Due to lack of insurance coverage, she's interested in stopping Wegovy and starting a different medication.     Weight Loss Medication Options:   Phentermine or other stimulant: could try  Topiramate: could try, monitor for mood changes with depression  Qsymia: could try  Contrave: could try   Naltrexone: could try   GLP1 agonist: would benefit from trying Zepbound but not covered next year  Metformin: could consider for pre-diabetes    She's interested in starting Qsymia for weight management instead of Wegovy. Mood is stable and blood pressure controlled. No history of tachycardia, palpitations, or kidney stones. Could add metformin in the future as well for insulin resistance.    Mental Health   stable    Allergies:   Could alternate with taking cetirizine every other to see if more effective     Supplements   stable      PLAN:                            Will discuss starting Qsymia with your primary care provider:   Start Qsymia (phentermine 3.75 mg/topiramate 23 mg) once daily for 14 days. Increase dose as tolerated to phentermine 7.5 mg/topiramate 46 mg once daily for 12 weeks   Can try alternating cetirizine and loratadine every other month to see if it's any more helpful for allergies    Follow-up: Schedule follow-up with primary care provider to follow-up on weight management - medication therapy management as needed     SUBJECTIVE/OBJECTIVE:                          Shannen Evans is a 44 year old female seen for a follow-up visit.  Last saw Micah Pearson.     Reason for visit: wegovy follow-up.    Allergies/ADRs: Reviewed in chart  Past Medical History: Reviewed in chart  Tobacco: She reports that she quit smoking about 13 years ago. Her smoking use  "included cigarettes. She has never used smokeless tobacco.  Alcohol: occasional  [employer] insurance.   Medication Adherence/Access: GLP-1 RA not covered on insurance next year.    Weight Management /pre-diabetes:    Patient reports she was having nausea on Wegovy and didn't find it helpful for appetite suppression. She stopped it due to change in coverage next year and the cash/compounding options are too expensive. Struggles with portion control, snacking, emotional eating.   Medications Tried/Failed:  Wegovy - nausea, didn't think it was helpful     MEN2/Medullary Thyroid Cancer: Negative   Pancreatitis: Negative   Baseline GI symptomatology: Negative, if anything had her gallbladder out previously     Initial Consult Weight: 273 lbs 9.6 ounces     Current weight today: 0 lbs 0 oz - no updated weight    Wt Readings from Last 4 Encounters:   08/08/24 264 lb (119.7 kg)   05/10/24 273 lb 9.6 oz (124.1 kg)   11/06/23 287 lb 9.6 oz (130.5 kg)   01/14/23 260 lb (117.9 kg)     Estimated body mass index is 41.35 kg/m  as calculated from the following:    Height as of this encounter: 5' 7\" (1.702 m).    Weight as of 8/8/24: 264 lb (119.7 kg).    Hemoglobin A1C   Date Value Ref Range Status   11/13/2023 5.7 (H) 0.0 - 5.6 % Final     Comment:     Normal <5.7%   Prediabetes 5.7-6.4%    Diabetes 6.5% or higher     Note: Adopted from ADA consensus guidelines.         Mental Health   Desvenlafaxine 50 mg once daily   Patient reports no current medication side effects.  Patient reports symptoms are stable. Struggles with anxiety from time to time.        Allergies:   Loratadine 10 mg once daily   This is partially effective. No side effects    Supplements   Vitamin D 2000 units once daily   No reported issues at this time.        Today's Vitals: Ht 5' 7\" (1.702 m)   LMP 08/18/2020 (Approximate)   BMI 41.35 kg/m    ----------------    I spent 21 minutes with this patient today. All changes were made via collaborative practice " agreement with Layne Gauthier PA-C and Dr. Francis. A copy of the visit note was provided to the patient's provider(s).    A summary of these recommendations was sent via [employer].      Telemedicine Visit Details  The patient's medications can be safely assessed via a telemedicine encounter.  Type of service:  Telephone visit  Originating Location (pt. Location): Home    Distant Location (provider location):  Off-site  Start Time: 3:08 PM  End Time: 3:29 PM     Medication Therapy Recommendations  Class 3 severe obesity with serious comorbidity and body mass index (BMI) of 40.0 to 44.9 in adult, unspecified obesity type (H)   1 Current Medication: Semaglutide-Weight Management (WEGOVY) 1.7 MG/0.75ML pen (Discontinued)   Current Medication Sig: Inject 1.7 mg subcutaneously once a week   Rationale: Cannot afford medication product - Cost - Adherence   Recommendation: Change Medication - Qsymia 3.75-23 MG Cp24   Status: Contact Provider - Awaiting Response   Identified Date: 11/8/2024

## 2024-11-08 NOTE — PATIENT INSTRUCTIONS
Recommendations from today's MTM visit:                                                      Will discuss starting Qsymia with your primary care provider:   Start Qsymia (phentermine 3.75 mg/topiramate 23 mg) once daily for 14 days. Increase dose as tolerated to phentermine 7.5 mg/topiramate 46 mg once daily for 12 weeks   Can try alternating cetirizine and loratadine every other month to see if it's any more helpful for allergies    Follow-up: Schedule follow-up with primary care provider to follow-up on weight management - medication therapy management as needed     QSYMIA (phentermine and topiramate)    We are considering starting Qsymia. This is a specific obesity medication and is a combination of sustained-release Phentermine and Topiramate. Qsymia is taken once daily in the morning.  There are a few different doses of the medication. Doses come in 3.75/23 mg (usually skipped), 7.5/46 mg, 11.25/69 mg, and 15/92 mg.     For some of our patients, the pills work right away. They feel and think quite differently about food. Other patients don't feel much of a change but find in fact they have lost weight! Like all weight loss medications, Qsymia works best when you help it work.  This means:    1) Have less tempting high calorie (fattening) food around the house or office    2) Have lower calorie food (fruits, vegetables,low fat meats and dairy) for snacks    3) Eat out only one time or less each week.   4) Eat your meals at a table with the TV or computer off.    Side-effects (generally well tolerated because it is a sustained release medication)    Topiramate:   -Tingling in hands,feet, or face (usually not very troublesome)   -Mental confusion and word finding trouble (about 10% of patients have this)     -Feeling sleepy or a bit dopey- this goes away very soon after starting      Phentermine:    -Feelings of racing pulse or rapid heart beat   -Increased anxiety/jitteriness   -Insomnia   -Dry mouth,  constipation    -Some people can get an elevated blood pressure. Please have your blood pressure rechecked 1-2 weeks after starting Qsymia and report results back to the clinic via [employer].    One of the dangers of topiramate is the possibility of birth defects--if you get pregnant when you are on it, there is the risk that your baby will be born with a cleft lip or palate.  If you are on topiramate and of child bearing age, you need to be on a reliable form of birth control or refrain from sexual intercourse.     Prior Authorization Process for Weight Management Medications: You are being prescribed a medication that will likely need to undergo a prior authorization.  A prior authorization is when the clinic needs to fill out a form that is sent to your insurance company to obtain coverage for that medication. The prescription will NOT automatically go to your pharmacy today if it needs a Prior Authorization. If the medication prior authorization is approved, the care team will contact you and the prescription will be released to your pharmacy. If denied, we will work to try and appeal the prior authorization if possible. The initial prior authorization process takes up to 5-10 business days and appeals can take up to 30 days. If you do not hear from us at the end of that time, you may contact the clinic.    If insurance does not cover Qsymia, typically costs around $150-$200 per month. Please check out the website [email or web address] and sign up for the Pharmacy savings card or if you would like to use [facility] for a lower copay.     For any questions or concerns please send a [employer] message to our team or call our weight management call center at [phone] during regular business hours. For questions during evenings or weekends your messages will be addressed during the next business day.  For emergencies please call 911 or seek immediate medical care.     Do not stop taking it if you don't  "think it's working. For some people it works even though they do not feel much different.    In order to get refills of this or any medication we prescribe you must be seen in the medical weight mgmt clinic every 2-4 months. Please have your pharmacy fax a refill request to [phone].    It was great speaking with you today.  I value your experience and would be very thankful for your time in providing feedback in our clinic survey. In the next few days, you may receive an email or text message from [employer] with a link to a survey related to your  clinical pharmacist.\"     To schedule another MTM appointment, please call the clinic directly or you may call the MTM scheduling line at [phone].    My Clinical Pharmacist's contact information:                                                      Please feel free to contact me with any questions or concerns you have.      Rand Oconnor, PharmD, [facility]  Medication Therapy Management Pharmacist      "

## 2024-11-13 ENCOUNTER — MYC MEDICAL ADVICE (OUTPATIENT)
Dept: INTERNAL MEDICINE | Facility: CLINIC | Age: 44
End: 2024-11-13
Payer: COMMERCIAL

## 2024-11-13 DIAGNOSIS — F32.0 CURRENT MILD EPISODE OF MAJOR DEPRESSIVE DISORDER WITHOUT PRIOR EPISODE (H): ICD-10-CM

## 2024-11-14 RX ORDER — DESVENLAFAXINE 50 MG/1
50 TABLET, FILM COATED, EXTENDED RELEASE ORAL DAILY
Qty: 90 TABLET | Refills: 0 | Status: SHIPPED | OUTPATIENT
Start: 2024-11-14

## 2024-11-15 NOTE — TELEPHONE ENCOUNTER
Call patient and assist in scheduling a follow-up appointment with me in the next month regarding weight issues and mental health. May use any current open appointment slot to schedule the appointment except for a preop/hospital follow-up slot  or end of day virtual appt slot.